# Patient Record
Sex: FEMALE | Race: BLACK OR AFRICAN AMERICAN | NOT HISPANIC OR LATINO | Employment: OTHER | URBAN - METROPOLITAN AREA
[De-identification: names, ages, dates, MRNs, and addresses within clinical notes are randomized per-mention and may not be internally consistent; named-entity substitution may affect disease eponyms.]

---

## 2017-01-28 ENCOUNTER — HOSPITAL ENCOUNTER (EMERGENCY)
Facility: HOSPITAL | Age: 57
Discharge: HOME | End: 2017-01-28
Attending: EMERGENCY MEDICINE | Admitting: EMERGENCY MEDICINE

## 2017-01-28 ENCOUNTER — APPOINTMENT (OUTPATIENT)
Dept: GENERAL RADIOLOGY | Facility: HOSPITAL | Age: 57
End: 2017-01-28

## 2017-01-28 VITALS
OXYGEN SATURATION: 95 % | TEMPERATURE: 97 F | WEIGHT: 230 LBS | SYSTOLIC BLOOD PRESSURE: 146 MMHG | DIASTOLIC BLOOD PRESSURE: 80 MMHG | HEIGHT: 66 IN | BODY MASS INDEX: 36.96 KG/M2 | RESPIRATION RATE: 18 BRPM | HEART RATE: 88 BPM

## 2017-01-28 DIAGNOSIS — R00.2 PALPITATIONS: ICD-10-CM

## 2017-01-28 DIAGNOSIS — I47.1 SVT (SUPRAVENTRICULAR TACHYCARDIA) (HCC): Primary | ICD-10-CM

## 2017-01-28 LAB
ALBUMIN SERPL-MCNC: 4.5 G/DL (ref 3.5–5.2)
ALBUMIN/GLOB SERPL: 1.5 G/DL
ALP SERPL-CCNC: 69 U/L (ref 39–117)
ALT SERPL W P-5'-P-CCNC: 46 U/L (ref 1–33)
ANION GAP SERPL CALCULATED.3IONS-SCNC: 16.7 MMOL/L
AST SERPL-CCNC: 34 U/L (ref 1–32)
BASOPHILS # BLD AUTO: 0.05 10*3/MM3 (ref 0–0.2)
BASOPHILS NFR BLD AUTO: 0.6 % (ref 0–1.5)
BILIRUB SERPL-MCNC: 0.2 MG/DL (ref 0.1–1.2)
BUN BLD-MCNC: 14 MG/DL (ref 6–20)
BUN/CREAT SERPL: 15.4 (ref 7–25)
CALCIUM SPEC-SCNC: 9.6 MG/DL (ref 8.6–10.5)
CHLORIDE SERPL-SCNC: 96 MMOL/L (ref 98–107)
CO2 SERPL-SCNC: 24.3 MMOL/L (ref 22–29)
CREAT BLD-MCNC: 0.91 MG/DL (ref 0.57–1)
DEPRECATED RDW RBC AUTO: 40 FL (ref 37–54)
EOSINOPHIL # BLD AUTO: 0.25 10*3/MM3 (ref 0–0.7)
EOSINOPHIL NFR BLD AUTO: 3.1 % (ref 0.3–6.2)
ERYTHROCYTE [DISTWIDTH] IN BLOOD BY AUTOMATED COUNT: 14.3 % (ref 11.7–13)
GFR SERPL CREATININE-BSD FRML MDRD: 77 ML/MIN/1.73
GLOBULIN UR ELPH-MCNC: 3 GM/DL
GLUCOSE BLD-MCNC: 188 MG/DL (ref 65–99)
HCT VFR BLD AUTO: 45.2 % (ref 35.6–45.5)
HGB BLD-MCNC: 14.9 G/DL (ref 11.9–15.5)
HOLD SPECIMEN: NORMAL
HOLD SPECIMEN: NORMAL
IMM GRANULOCYTES # BLD: 0 10*3/MM3 (ref 0–0.03)
IMM GRANULOCYTES NFR BLD: 0 % (ref 0–0.5)
INR PPP: 1.38 (ref 0.9–1.1)
LYMPHOCYTES # BLD AUTO: 2.76 10*3/MM3 (ref 0.9–4.8)
LYMPHOCYTES NFR BLD AUTO: 34.5 % (ref 19.6–45.3)
MCH RBC QN AUTO: 25.8 PG (ref 26.9–32)
MCHC RBC AUTO-ENTMCNC: 33 G/DL (ref 32.4–36.3)
MCV RBC AUTO: 78.3 FL (ref 80.5–98.2)
MONOCYTES # BLD AUTO: 0.52 10*3/MM3 (ref 0.2–1.2)
MONOCYTES NFR BLD AUTO: 6.5 % (ref 5–12)
NEUTROPHILS # BLD AUTO: 4.42 10*3/MM3 (ref 1.9–8.1)
NEUTROPHILS NFR BLD AUTO: 55.3 % (ref 42.7–76)
PLATELET # BLD AUTO: 385 10*3/MM3 (ref 140–500)
PMV BLD AUTO: 11.2 FL (ref 6–12)
POTASSIUM BLD-SCNC: 3.5 MMOL/L (ref 3.5–5.2)
PROT SERPL-MCNC: 7.5 G/DL (ref 6–8.5)
PROTHROMBIN TIME: 16.4 SECONDS (ref 11.7–14.2)
RBC # BLD AUTO: 5.77 10*6/MM3 (ref 3.9–5.2)
SODIUM BLD-SCNC: 137 MMOL/L (ref 136–145)
TROPONIN T SERPL-MCNC: <0.01 NG/ML (ref 0–0.03)
WBC NRBC COR # BLD: 8 10*3/MM3 (ref 4.5–10.7)
WHOLE BLOOD HOLD SPECIMEN: NORMAL
WHOLE BLOOD HOLD SPECIMEN: NORMAL

## 2017-01-28 PROCEDURE — 80053 COMPREHEN METABOLIC PANEL: CPT | Performed by: EMERGENCY MEDICINE

## 2017-01-28 PROCEDURE — 71020 HC CHEST PA AND LATERAL: CPT

## 2017-01-28 PROCEDURE — 25010000002 ADENOSINE PER 6 MG

## 2017-01-28 PROCEDURE — 85610 PROTHROMBIN TIME: CPT | Performed by: EMERGENCY MEDICINE

## 2017-01-28 PROCEDURE — 96374 THER/PROPH/DIAG INJ IV PUSH: CPT

## 2017-01-28 PROCEDURE — 84484 ASSAY OF TROPONIN QUANT: CPT | Performed by: EMERGENCY MEDICINE

## 2017-01-28 PROCEDURE — 93010 ELECTROCARDIOGRAM REPORT: CPT | Performed by: INTERNAL MEDICINE

## 2017-01-28 PROCEDURE — 85025 COMPLETE CBC W/AUTO DIFF WBC: CPT | Performed by: EMERGENCY MEDICINE

## 2017-01-28 PROCEDURE — 93005 ELECTROCARDIOGRAM TRACING: CPT | Performed by: EMERGENCY MEDICINE

## 2017-01-28 PROCEDURE — 99283 EMERGENCY DEPT VISIT LOW MDM: CPT

## 2017-01-28 RX ORDER — ADENOSINE 3 MG/ML
6 INJECTION, SOLUTION INTRAVENOUS ONCE
Status: COMPLETED | OUTPATIENT
Start: 2017-01-28 | End: 2017-01-28

## 2017-01-28 RX ORDER — ASPIRIN 325 MG
325 TABLET ORAL ONCE
Status: DISCONTINUED | OUTPATIENT
Start: 2017-01-28 | End: 2017-01-28 | Stop reason: HOSPADM

## 2017-01-28 RX ORDER — SODIUM CHLORIDE 0.9 % (FLUSH) 0.9 %
10 SYRINGE (ML) INJECTION AS NEEDED
Status: DISCONTINUED | OUTPATIENT
Start: 2017-01-28 | End: 2017-01-28 | Stop reason: HOSPADM

## 2017-01-28 RX ORDER — ADENOSINE 3 MG/ML
12 INJECTION, SOLUTION INTRAVENOUS ONCE
Status: DISCONTINUED | OUTPATIENT
Start: 2017-01-28 | End: 2017-01-28

## 2017-01-28 RX ORDER — ADENOSINE 3 MG/ML
INJECTION, SOLUTION INTRAVENOUS
Status: COMPLETED
Start: 2017-01-28 | End: 2017-01-28

## 2017-01-28 RX ADMIN — ADENOSINE 6 MG: 3 INJECTION, SOLUTION INTRAVENOUS at 04:41

## 2017-01-28 NOTE — ED PROVIDER NOTES
EMERGENCY DEPARTMENT ENCOUNTER    CHIEF COMPLAINT  Chief Complaint: Palpitations  History given by: patient  History limited by: n/a  Room Number: 07/07  PMD: Kit Gonzalez MD      HPI:  Pt is a 56 y.o. female who presents complaining of palpitations that were present today upon waking up. Pt states that she has a hx of SVT and attempted to bear down and use cold water to slow her HR w/o relief. Pt states that in the past, she was converted with Adenosine. Pt states that she had an ablation 12 years ago, and has been w/o SVT since. Pt has chronic RUE and RLE weakness secondary to a CVA    Duration:  Prior to arrival  Onset: sudden  Timing: constant  Location: cardio  Radiation: none  Quality: heart racing  Intensity/Severity: severe  Progression: unchanged   Associated Symptoms: none  Aggravating Factors: none  Alleviating Factors: none  Previous Episodes: hx of SVT  Treatment before arrival: cardiac ablation 12 years ago    PAST MEDICAL HISTORY  Active Ambulatory Problems     Diagnosis Date Noted   • Degeneration of cervical intervertebral disc 05/31/2016   • Right spastic hemiparesis 05/31/2016   • Acute metabolic encephalopathy 11/29/2016     Resolved Ambulatory Problems     Diagnosis Date Noted   • No Resolved Ambulatory Problems     Past Medical History   Diagnosis Date   • Diabetes mellitus    • Hyperlipidemia    • Hypertension    • Stroke        PAST SURGICAL HISTORY  Past Surgical History   Procedure Laterality Date   • Spine surgery         FAMILY HISTORY  Family History   Problem Relation Age of Onset   • Hypertension Mother    • Diabetes Mother    • Ovarian cancer Mother    • Cancer Father        SOCIAL HISTORY  Social History     Social History   • Marital status: Single     Spouse name: N/A   • Number of children: N/A   • Years of education: N/A     Occupational History   • Not on file.     Social History Main Topics   • Smoking status: Former Smoker     Packs/day: 0.25     Years: 15.00   •  Smokeless tobacco: Never Used   • Alcohol use No   • Drug use: No   • Sexual activity: Not on file     Other Topics Concern   • Not on file     Social History Narrative       ALLERGIES  Metformin and related    REVIEW OF SYSTEMS  Review of Systems   Constitutional: Negative for chills and fever.   HENT: Negative for congestion and sore throat.    Eyes: Negative.    Respiratory: Negative for cough and shortness of breath.    Cardiovascular: Positive for palpitations. Negative for chest pain and leg swelling.   Gastrointestinal: Negative for abdominal pain, diarrhea and vomiting.   Genitourinary: Negative for difficulty urinating and dysuria.   Musculoskeletal: Negative for back pain and neck pain.   Skin: Negative for rash and wound.   Allergic/Immunologic: Negative.    Neurological: Negative for dizziness, weakness, numbness and headaches.   Psychiatric/Behavioral: Negative.    All other systems reviewed and are negative.      PHYSICAL EXAM  ED Triage Vitals   Temp Heart Rate Resp BP SpO2   01/28/17 0427 01/28/17 0426 01/28/17 0426 -- 01/28/17 0426   97 °F (36.1 °C) 196 20  96 %      Temp src Heart Rate Source Patient Position BP Location FiO2 (%)   01/28/17 0427 -- -- -- --   Tympanic           Physical Exam   Constitutional: She is oriented to person, place, and time and well-developed, well-nourished, and in no distress. No distress.   HENT:   Head: Normocephalic and atraumatic.   Eyes: EOM are normal. Pupils are equal, round, and reactive to light.   Neck: Normal range of motion. Neck supple.   Cardiovascular: Regular rhythm and normal heart sounds.  Tachycardia present.    Pulmonary/Chest: Effort normal and breath sounds normal. No respiratory distress.   Abdominal: Soft. There is no tenderness. There is no rebound and no guarding.   Musculoskeletal: Normal range of motion. She exhibits no edema.   Neurological: She is alert and oriented to person, place, and time.   Chronic RUE and RLE weakness    Skin: Skin  is warm and dry. No rash noted.   Psychiatric: Mood and affect normal.   Nursing note and vitals reviewed.      LAB RESULTS  Lab Results (last 24 hours)     Procedure Component Value Units Date/Time    CBC & Differential [[id number]] Collected:  01/28/17 0440    Specimen:  Blood Updated:  01/28/17 0457    Narrative:       The following orders were created for panel order CBC & Differential.  Procedure                               Abnormality         Status                     ---------                               -----------         ------                     CBC Auto Differential[[id number]]         Abnormal            Final result                 Please view results for these tests on the individual orders.    Comprehensive Metabolic Panel [[id number]]  (Abnormal) Collected:  01/28/17 0440    Specimen:  Blood Updated:  01/28/17 0517     Glucose 188 (H) mg/dL      BUN 14 mg/dL      Creatinine 0.91 mg/dL      Sodium 137 mmol/L      Potassium 3.5 mmol/L      Chloride 96 (L) mmol/L      CO2 24.3 mmol/L      Calcium 9.6 mg/dL      Total Protein 7.5 g/dL      Albumin 4.50 g/dL      ALT (SGPT) 46 (H) U/L      AST (SGOT) 34 (H) U/L      Alkaline Phosphatase 69 U/L      Total Bilirubin 0.2 mg/dL      eGFR   Amer 77 mL/min/1.73      Globulin 3.0 gm/dL      A/G Ratio 1.5 g/dL      BUN/Creatinine Ratio 15.4      Anion Gap 16.7 mmol/L     Troponin [[id number]]  (Normal) Collected:  01/28/17 0440    Specimen:  Blood Updated:  01/28/17 0517     Troponin T <0.010 ng/mL     Narrative:       Troponin T Reference Ranges:  Less than 0.03 ng/mL:    Negative for AMI  0.03 to 0.09 ng/mL:      Indeterminant for AMI  Greater than 0.09 ng/mL: Positive for AMI    Protime-INR [[id number]]  (Abnormal) Collected:  01/28/17 0440    Specimen:  Blood Updated:  01/28/17 0510     Protime 16.4 (H) Seconds      INR 1.38 (H)     CBC Auto Differential [[id number]]  (Abnormal) Collected:  01/28/17 0440    Specimen:  Blood Updated:  01/28/17 0457     WBC  8.00 10*3/mm3      RBC 5.77 (H) 10*6/mm3      Hemoglobin 14.9 g/dL      Hematocrit 45.2 %      MCV 78.3 (L) fL      MCH 25.8 (L) pg      MCHC 33.0 g/dL      RDW 14.3 (H) %      RDW-SD 40.0 fl      MPV 11.2 fL      Platelets 385 10*3/mm3      Neutrophil % 55.3 %      Lymphocyte % 34.5 %      Monocyte % 6.5 %      Eosinophil % 3.1 %      Basophil % 0.6 %      Immature Grans % 0.0 %      Neutrophils, Absolute 4.42 10*3/mm3      Lymphocytes, Absolute 2.76 10*3/mm3      Monocytes, Absolute 0.52 10*3/mm3      Eosinophils, Absolute 0.25 10*3/mm3      Basophils, Absolute 0.05 10*3/mm3      Immature Grans, Absolute 0.00 10*3/mm3           I ordered the above labs and reviewed the results    RADIOLOGY  XR Chest 2 View   Final Result      CXR shows NAD    I ordered the above noted radiological studies. Interpreted by radiologist. Reviewed by me in PACS.       PROCEDURES  Procedures    EKG           EKG time: 04:39  Rhythm/Rate:   P waves and DE: absent  QRS, axis: widened with RBBB   ST and T waves: nml     Interpreted Contemporaneously by me, independently viewed  No prior for comparison     EKG           EKG time: 04;45  Rhythm/Rate: NSR 98  P waves and DE: nml  QRS, axis: widened with RBBB   ST and T waves: nml     Interpreted Contemporaneously by me, independently viewed  changed compared to prior at 04:39    PROGRESS AND CONSULTS  ED Course     04:31  HR is in the 190's.     04:32  Adenosine ordered    04:36  Labs, CXR, and EKG ordered for further evaluation.     04:42  6 of Adenosine given.    04:43  Pt is back in sinus rhythm. HR in the low 100's.     05:53  Rechecked the patient who is in NAD and is resting comfortably. Pt has remained in a NSR, HR currently in the 90's. Advised pt that the work up shows NAD. Will provide referral for the pt to f/u with cardiology on Monday. Pt understands and agrees with the plan, all questions answered.    MEDICAL DECISION MAKING  Results were reviewed/discussed with the  patient and they were also made aware of online access. Pt also made aware that some labs, such as cultures, will not be resulted during ER visit and follow up with PMD is necessary.     MDM  Number of Diagnoses or Management Options     Amount and/or Complexity of Data Reviewed  Clinical lab tests: ordered and reviewed (WBC is 8.00  PT/INR is 16.4/1.38  Troponin is <0.010)  Tests in the radiology section of CPT®: ordered and reviewed (CXR shows NAD)  Tests in the medicine section of CPT®: ordered and reviewed (See EKG procedure note. )  Independent visualization of images, tracings, or specimens: yes    Patient Progress  Patient progress: stable         DIAGNOSIS  Final diagnoses:   SVT (supraventricular tachycardia)   Palpitations       DISPOSITION  DISCHARGE    Patient discharged in stable condition.    Reviewed implications of results, diagnosis, meds, responsibility to follow up, warning signs and symptoms of possible worsening, potential complications and reasons to return to ER.    Patient/Family voiced understanding of above instructions.    Discussed plan for discharge, as there is no emergent indication for admission.  Pt/family is agreeable and understands need for follow up and repeat testing.  Pt is aware that discharge does not mean that nothing is wrong but it indicates no emergency is present that requires admission and they must continue care with follow-up as given below or physician of their choice.     FOLLOW-UP  Danny Bains MD  [facility] [id number] [phone]    Schedule an appointment as soon as possible for a visit           Medication List      Notice     No changes were made to your prescriptions during this visit.        Latest Documented Vital Signs:  As of 6:47 AM  BP- 146/80 HR- 88 Temp- 97 °F (36.1 °C) (Tympanic) O2 sat- 95%    --  Documentation assistance provided by silva Johnson for Dr York.  Information recorded by the silva was done  at my direction and has been verified and validated by me.           Dayan Johnson  01/28/17 0602       Darin York MD  01/28/17 0647

## 2017-02-02 ENCOUNTER — HOSPITAL ENCOUNTER (EMERGENCY)
Facility: HOSPITAL | Age: 57
Discharge: HOME | End: 2017-02-02
Attending: EMERGENCY MEDICINE | Admitting: EMERGENCY MEDICINE

## 2017-02-02 VITALS
DIASTOLIC BLOOD PRESSURE: 67 MMHG | RESPIRATION RATE: 16 BRPM | SYSTOLIC BLOOD PRESSURE: 112 MMHG | OXYGEN SATURATION: 96 % | BODY MASS INDEX: 36.96 KG/M2 | HEIGHT: 66 IN | WEIGHT: 230 LBS | HEART RATE: 64 BPM | TEMPERATURE: 96.8 F

## 2017-02-02 DIAGNOSIS — R00.2 HEART PALPITATIONS: Primary | ICD-10-CM

## 2017-02-02 PROCEDURE — 93010 ELECTROCARDIOGRAM REPORT: CPT | Performed by: INTERNAL MEDICINE

## 2017-02-02 PROCEDURE — 93005 ELECTROCARDIOGRAM TRACING: CPT

## 2017-02-02 PROCEDURE — 99283 EMERGENCY DEPT VISIT LOW MDM: CPT

## 2017-02-03 NOTE — ED PROVIDER NOTES
"EMERGENCY DEPARTMENT ENCOUNTER       CHIEF COMPLAINT  Chief Complaint: Palpitations  History given by: Patient  History limited by: N/A  Room Number: 16/16  PMD: Kit Gonzalez MD      HPI:  HPI Comments: Pt reports that she has h/o SVT. Pt reports that at about 17:45 today, she had an episode of palpitations described as \"rapid\" that has now resolved. Pt reports that during her palpitations, pt was lightheaded (also resolved). Pt denies dyspnea and CP currently and reports that she feels at her baseline. There are no other complaints at this time.       Patient is a 56 y.o. female presenting with palpitations.   Palpitations   Palpitations quality:  Fast  Onset quality:  Sudden  Duration: onset at about 17:45 today.  Timing:  Constant  Progression:  Resolved  Context: not dehydration    Relieved by:  Nothing  Worsened by:  Nothing  Associated symptoms: dizziness (lightheadedness with palpitations - now resolved)    Associated symptoms: no back pain, no chest pain, no chest pressure, no cough, no leg pain, no lower extremity edema, no malaise/fatigue, no nausea, no numbness, no orthopnea, no PND, no syncope, no vomiting and no weakness          PAST MEDICAL HISTORY  Active Ambulatory Problems     Diagnosis Date Noted   • Degeneration of cervical intervertebral disc 05/31/2016   • Right spastic hemiparesis 05/31/2016   • Acute metabolic encephalopathy 11/29/2016     Resolved Ambulatory Problems     Diagnosis Date Noted   • No Resolved Ambulatory Problems     Past Medical History   Diagnosis Date   • Diabetes mellitus    • Hyperlipidemia    • Hypertension    • Stroke          PAST SURGICAL HISTORY  Past Surgical History   Procedure Laterality Date   • Spine surgery           FAMILY HISTORY  Family History   Problem Relation Age of Onset   • Hypertension Mother    • Diabetes Mother    • Ovarian cancer Mother    • Cancer Father          SOCIAL HISTORY  Social History     Social History   • Marital status: Single    " " Spouse name: N/A   • Number of children: N/A   • Years of education: N/A     Occupational History   • Not on file.     Social History Main Topics   • Smoking status: Former Smoker     Packs/day: 0.25     Years: 15.00   • Smokeless tobacco: Never Used   • Alcohol use No   • Drug use: No   • Sexual activity: Not on file     Other Topics Concern   • Not on file     Social History Narrative         ALLERGIES  Metformin and related        REVIEW OF SYSTEMS  Review of Systems   Constitutional: Negative for chills, fatigue and malaise/fatigue.   HENT: Negative for congestion, rhinorrhea and sore throat.    Eyes: Negative for pain.   Respiratory: Negative for cough and wheezing.    Cardiovascular: Positive for palpitations (\"rapid\" - now resolved). Negative for chest pain, orthopnea, leg swelling, syncope and PND.   Gastrointestinal: Negative for abdominal pain, diarrhea, nausea and vomiting.   Genitourinary: Negative for difficulty urinating, dysuria, flank pain and frequency.   Musculoskeletal: Negative for arthralgias, back pain, myalgias, neck pain and neck stiffness.   Skin: Negative for rash.   Neurological: Positive for dizziness (lightheadedness with palpitations - now resolved) and light-headedness (lightheadedness with palpitations - now resolved). Negative for speech difficulty, weakness, numbness and headaches.   Psychiatric/Behavioral: Negative.    All other systems reviewed and are negative.           PHYSICAL EXAM  ED Triage Vitals   Temp Heart Rate Resp BP SpO2   02/02/17 1801 02/02/17 1801 02/02/17 1801 02/02/17 1806 02/02/17 1801   96.8 °F (36 °C) 66 18 169/79 99 % WNL      Temp src Heart Rate Source Patient Position BP Location FiO2 (%)   02/02/17 1801 02/02/17 2119 02/02/17 1806 02/02/17 1806 --   Tympanic Monitor Sitting Left arm        Physical Exam   Constitutional: She is oriented to person, place, and time. No distress.   HENT:   Head: Normocephalic.   Mouth/Throat: Mucous membranes are normal. "   Eyes: EOM are normal. Pupils are equal, round, and reactive to light.   Neck: Normal range of motion. Neck supple.   Cardiovascular: Normal rate, regular rhythm and normal heart sounds.    Pulmonary/Chest: Effort normal and breath sounds normal. No respiratory distress. She has no decreased breath sounds. She has no wheezes. She has no rhonchi. She has no rales.   Abdominal: Soft. There is no tenderness. There is no rebound and no guarding.   Musculoskeletal: Normal range of motion.   Neurological: She is alert and oriented to person, place, and time. She has normal sensation and normal strength.   Skin: Skin is warm and dry.   Psychiatric: Mood and affect normal.   Nursing note and vitals reviewed.            PROCEDURES  Procedures    EKG:           EKG time: 18:33  Rhythm/Rate: NSR   QRS, axis: RBBB     Interpreted Contemporaneously by me, independently viewed  Unchanged compared to prior 01/28/17      PROGRESS AND CONSULTS  ED Course     10:58 PM: Rechecked pt. Pt is resting comfortably and appears in no acute distress. Pt is currently in NSR rate 61. Pt advised to f/u with cardiologist. RTER warnings given. Pt agrees with plan for discharge.           MEDICAL DECISION MAKING      MDM  Number of Diagnoses or Management Options     Amount and/or Complexity of Data Reviewed  Tests in the medicine section of CPT®: reviewed and ordered (EKG interpreted.   )    Patient Progress  Patient progress: stable             DIAGNOSIS  Final diagnoses:   Heart palpitations         DISPOSITION  Pt discharged.    DISCHARGE    Patient discharged in stable condition.    Reviewed implications of results, diagnosis, meds, responsibility to follow up, warning signs and symptoms of possible worsening, potential complications and reasons to return to ER.    Patient/Family voiced understanding of above instructions.    Discussed plan for discharge, as there is no emergent indication for admission.  Pt/family is agreeable and  understands need for follow up and repeat testing.  Pt is aware that discharge does not mean that nothing is wrong but it indicates no emergency is present that requires admission and they must continue care with follow-up as given below or physician of their choice.     FOLLOW-UP  Danny Bains MD  [address]  [address] [address]  [phone]    Schedule an appointment as soon as possible for a visit          Latest Documented Vital Signs:  As of 11:03 PM  BP- 112/67 HR- 68 Temp- 96.8 °F (36 °C) (Tympanic) O2 sat- 96%        --  Documentation assistance provided by silva Sutton for Dr. Ninoska MD.  Information recorded by the scribe was done at my direction and has been verified and validated by me.              Colin Sutton  02/02/17 9529       Beni Xiao MD  02/03/17 3929

## 2017-02-03 NOTE — ED NOTES
"Patient reports that earlier today she felt her heart beating fast. States \"It would go fast and then it would slow down. Then it would go fast again then slow down. When it was fast I was sweating and short of breathe but not I feel fine.\" No complaints at this time.      Keke Phillips RN  02/02/17 5912    "

## 2017-03-25 ENCOUNTER — APPOINTMENT (OUTPATIENT)
Dept: GENERAL RADIOLOGY | Facility: HOSPITAL | Age: 57
End: 2017-03-25

## 2017-03-25 ENCOUNTER — HOSPITAL ENCOUNTER (EMERGENCY)
Facility: HOSPITAL | Age: 57
Discharge: HOME | End: 2017-03-25
Attending: EMERGENCY MEDICINE | Admitting: EMERGENCY MEDICINE

## 2017-03-25 VITALS
DIASTOLIC BLOOD PRESSURE: 72 MMHG | SYSTOLIC BLOOD PRESSURE: 150 MMHG | HEIGHT: 66 IN | BODY MASS INDEX: 36.48 KG/M2 | RESPIRATION RATE: 18 BRPM | WEIGHT: 227 LBS | TEMPERATURE: 96.8 F | HEART RATE: 80 BPM | OXYGEN SATURATION: 98 %

## 2017-03-25 DIAGNOSIS — M10.9 ACUTE GOUT OF LEFT FOOT, UNSPECIFIED CAUSE: Primary | ICD-10-CM

## 2017-03-25 LAB
ALBUMIN SERPL-MCNC: 4.4 G/DL (ref 3.5–5.2)
ALBUMIN/GLOB SERPL: 1.3 G/DL
ALP SERPL-CCNC: 67 U/L (ref 39–117)
ALT SERPL W P-5'-P-CCNC: 45 U/L (ref 1–33)
ANION GAP SERPL CALCULATED.3IONS-SCNC: 15.4 MMOL/L
AST SERPL-CCNC: 36 U/L (ref 1–32)
BASOPHILS # BLD AUTO: 0.03 10*3/MM3 (ref 0–0.2)
BASOPHILS NFR BLD AUTO: 0.4 % (ref 0–1.5)
BILIRUB SERPL-MCNC: 0.4 MG/DL (ref 0.1–1.2)
BUN BLD-MCNC: 16 MG/DL (ref 6–20)
BUN/CREAT SERPL: 16.3 (ref 7–25)
CALCIUM SPEC-SCNC: 9.6 MG/DL (ref 8.6–10.5)
CHLORIDE SERPL-SCNC: 97 MMOL/L (ref 98–107)
CO2 SERPL-SCNC: 29.6 MMOL/L (ref 22–29)
CREAT BLD-MCNC: 0.98 MG/DL (ref 0.57–1)
DEPRECATED RDW RBC AUTO: 40.8 FL (ref 37–54)
EOSINOPHIL # BLD AUTO: 0.12 10*3/MM3 (ref 0–0.7)
EOSINOPHIL NFR BLD AUTO: 1.6 % (ref 0.3–6.2)
ERYTHROCYTE [DISTWIDTH] IN BLOOD BY AUTOMATED COUNT: 14.4 % (ref 11.7–13)
GFR SERPL CREATININE-BSD FRML MDRD: 71 ML/MIN/1.73
GLOBULIN UR ELPH-MCNC: 3.4 GM/DL
GLUCOSE BLD-MCNC: 171 MG/DL (ref 65–99)
HCT VFR BLD AUTO: 43.2 % (ref 35.6–45.5)
HGB BLD-MCNC: 14.1 G/DL (ref 11.9–15.5)
IMM GRANULOCYTES # BLD: 0.02 10*3/MM3 (ref 0–0.03)
IMM GRANULOCYTES NFR BLD: 0.3 % (ref 0–0.5)
LYMPHOCYTES # BLD AUTO: 1.46 10*3/MM3 (ref 0.9–4.8)
LYMPHOCYTES NFR BLD AUTO: 19.5 % (ref 19.6–45.3)
MCH RBC QN AUTO: 25.5 PG (ref 26.9–32)
MCHC RBC AUTO-ENTMCNC: 32.6 G/DL (ref 32.4–36.3)
MCV RBC AUTO: 78.3 FL (ref 80.5–98.2)
MONOCYTES # BLD AUTO: 0.57 10*3/MM3 (ref 0.2–1.2)
MONOCYTES NFR BLD AUTO: 7.6 % (ref 5–12)
NEUTROPHILS # BLD AUTO: 5.3 10*3/MM3 (ref 1.9–8.1)
NEUTROPHILS NFR BLD AUTO: 70.6 % (ref 42.7–76)
PLATELET # BLD AUTO: 326 10*3/MM3 (ref 140–500)
PMV BLD AUTO: 11 FL (ref 6–12)
POTASSIUM BLD-SCNC: 3.2 MMOL/L (ref 3.5–5.2)
PROT SERPL-MCNC: 7.8 G/DL (ref 6–8.5)
RBC # BLD AUTO: 5.52 10*6/MM3 (ref 3.9–5.2)
SODIUM BLD-SCNC: 142 MMOL/L (ref 136–145)
URATE SERPL-MCNC: 9.9 MG/DL (ref 2.4–5.7)
WBC NRBC COR # BLD: 7.5 10*3/MM3 (ref 4.5–10.7)

## 2017-03-25 PROCEDURE — 80053 COMPREHEN METABOLIC PANEL: CPT | Performed by: EMERGENCY MEDICINE

## 2017-03-25 PROCEDURE — 73630 X-RAY EXAM OF FOOT: CPT

## 2017-03-25 PROCEDURE — 84550 ASSAY OF BLOOD/URIC ACID: CPT | Performed by: EMERGENCY MEDICINE

## 2017-03-25 PROCEDURE — 85025 COMPLETE CBC W/AUTO DIFF WBC: CPT | Performed by: EMERGENCY MEDICINE

## 2017-03-25 PROCEDURE — 99284 EMERGENCY DEPT VISIT MOD MDM: CPT

## 2017-03-25 RX ORDER — SENNOSIDES 8.6 MG
TABLET ORAL AS NEEDED
COMMUNITY

## 2017-03-25 RX ORDER — HYDROCODONE BITARTRATE AND ACETAMINOPHEN 5; 325 MG/1; MG/1
TABLET ORAL EVERY 8 HOURS
COMMUNITY
Start: 2016-11-19 | End: 2019-01-05

## 2017-03-25 RX ORDER — ONDANSETRON 4 MG/1
4 TABLET, ORALLY DISINTEGRATING ORAL ONCE
Status: COMPLETED | OUTPATIENT
Start: 2017-03-25 | End: 2017-03-25

## 2017-03-25 RX ORDER — COLCHICINE 0.6 MG/1
0.6 TABLET ORAL DAILY
Qty: 20 TABLET | Refills: 0 | OUTPATIENT
Start: 2017-03-25 | End: 2019-03-05

## 2017-03-25 RX ORDER — HYDROCODONE BITARTRATE AND ACETAMINOPHEN 5; 325 MG/1; MG/1
1 TABLET ORAL EVERY 4 HOURS PRN
Qty: 12 TABLET | Refills: 0 | Status: SHIPPED | OUTPATIENT
Start: 2017-03-25 | End: 2019-01-05 | Stop reason: SDUPTHER

## 2017-03-25 RX ORDER — GLIPIZIDE 10 MG/1
10 TABLET ORAL 2 TIMES DAILY
COMMUNITY

## 2017-03-25 RX ORDER — OXYCODONE HYDROCHLORIDE AND ACETAMINOPHEN 5; 325 MG/1; MG/1
2 TABLET ORAL ONCE
Status: COMPLETED | OUTPATIENT
Start: 2017-03-25 | End: 2017-03-25

## 2017-03-25 RX ORDER — GABAPENTIN 300 MG/1
CAPSULE ORAL
COMMUNITY

## 2017-03-25 RX ORDER — METOPROLOL SUCCINATE 25 MG/1
25 TABLET, EXTENDED RELEASE ORAL
COMMUNITY
Start: 2017-03-08 | End: 2017-04-17

## 2017-03-25 RX ORDER — HYDROCHLOROTHIAZIDE 25 MG/1
TABLET ORAL
COMMUNITY

## 2017-03-25 RX ORDER — AMMONIUM LACTATE 12 G/100G
CREAM TOPICAL
COMMUNITY
Start: 2016-09-29

## 2017-03-25 RX ORDER — LOSARTAN POTASSIUM 25 MG/1
TABLET ORAL
COMMUNITY
Start: 2016-04-02 | End: 2019-03-05

## 2017-03-25 RX ORDER — GLIMEPIRIDE 4 MG/1
TABLET ORAL
COMMUNITY
Start: 2016-02-23

## 2017-03-25 RX ORDER — OXYBUTYNIN CHLORIDE 10 MG/1
TABLET, EXTENDED RELEASE ORAL
COMMUNITY

## 2017-03-25 RX ORDER — COLCHICINE 0.6 MG/1
TABLET ORAL
COMMUNITY
Start: 2016-11-19 | End: 2019-03-05

## 2017-03-25 RX ADMIN — OXYCODONE HYDROCHLORIDE AND ACETAMINOPHEN 2 TABLET: 5; 325 TABLET ORAL at 09:15

## 2017-03-25 RX ADMIN — ONDANSETRON 4 MG: 4 TABLET, ORALLY DISINTEGRATING ORAL at 09:17

## 2017-03-25 NOTE — ED PROVIDER NOTES
EMERGENCY DEPARTMENT ENCOUNTER    CHIEF COMPLAINT  Chief Complaint: Foot pain  History given by:Pt  History limited by:None  Room Number: 03/03  PMD: Kit Gonzalez MD      HPI:  Pt is a 56 y.o. female who presents history of gout who presents complaining of left foot pain onset 3 days ago.  Patient denies recent injury or trauma, and states that pain has progressively gotten worse. She reports she takes meds for Gout and ran out yesterday. She is also on Xarelto.    Past Medical History of DM, Gout, HTN    Duration: 3 days  Timing:constant  Location:left foot  Radiation:none  Quality:did not specify  Intensity/Severity:moderate  Progression:gradually worsening  Associated Symptoms:none  Aggravating Factors:movement  Alleviating Factors:none  Previous Episodes:hx of gout  Treatment before arrival:none    PAST MEDICAL HISTORY  Active Ambulatory Problems     Diagnosis Date Noted   • Degeneration of cervical intervertebral disc 05/31/2016   • Right spastic hemiparesis 05/31/2016   • Acute metabolic encephalopathy 11/29/2016     Resolved Ambulatory Problems     Diagnosis Date Noted   • No Resolved Ambulatory Problems     Past Medical History:   Diagnosis Date   • Diabetes mellitus    • Gout    • Hyperlipidemia    • Hypertension    • Stroke        PAST SURGICAL HISTORY  Past Surgical History:   Procedure Laterality Date   • SPINE SURGERY         FAMILY HISTORY  Family History   Problem Relation Age of Onset   • Hypertension Mother    • Diabetes Mother    • Ovarian cancer Mother    • Cancer Father        SOCIAL HISTORY  Social History     Social History   • Marital status: Single     Spouse name: N/A   • Number of children: N/A   • Years of education: N/A     Occupational History   • Not on file.     Social History Main Topics   • Smoking status: Former Smoker     Packs/day: 0.25     Years: 15.00   • Smokeless tobacco: Never Used   • Alcohol use No   • Drug use: No   • Sexual activity: Not on file     Other Topics  Concern   • Not on file     Social History Narrative   • No narrative on file         ALLERGIES  Metformin and related    REVIEW OF SYSTEMS  Review of Systems   Constitutional: Negative for chills and fever.   HENT: Negative for sore throat.    Respiratory: Negative for shortness of breath.    Cardiovascular: Negative for chest pain.   Gastrointestinal: Negative for nausea and vomiting.   Genitourinary: Negative for dysuria.   Musculoskeletal: Negative for back pain.        Left foot pain   Skin: Negative for rash.   Neurological: Negative for dizziness.   Psychiatric/Behavioral: The patient is not nervous/anxious.        PHYSICAL EXAM  ED Triage Vitals   Temp Heart Rate Resp BP SpO2   03/25/17 0503 03/25/17 0503 03/25/17 0503 03/25/17 0503 03/25/17 0503   98.3 °F (36.8 °C) 93 16 183/94 98 %      Temp src Heart Rate Source Patient Position BP Location FiO2 (%)   03/25/17 0640 03/25/17 0640 03/25/17 0640 03/25/17 0640 --   Oral Monitor Lying Right arm        Physical Exam   Constitutional: She is well-developed, well-nourished, and in no distress.   HENT:   Head: Normocephalic.   Mouth/Throat: Mucous membranes are normal.   Eyes: No scleral icterus.   Neck: Normal range of motion.   Cardiovascular: Normal rate, regular rhythm and normal heart sounds.    Pulses:       Dorsalis pedis pulses are 2+ on the right side, and 2+ on the left side.        Posterior tibial pulses are 2+ on the right side, and 2+ on the left side.   Pulmonary/Chest: Effort normal and breath sounds normal.   Musculoskeletal: Normal range of motion.        Left foot: There is tenderness and swelling.        Feet:    Neurological: She is alert.   Skin: Skin is warm and dry.   Psychiatric: Mood and affect normal.   Nursing note and vitals reviewed.      LAB RESULTS  Recent Results (from the past 24 hour(s))   Comprehensive Metabolic Panel    Collection Time: 03/25/17  7:18 AM   Result Value Ref Range    Glucose 171 (H) 65 - 99 mg/dL    BUN 16 6 - 20  mg/dL    Creatinine 0.98 0.57 - 1.00 mg/dL    Sodium 142 136 - 145 mmol/L    Potassium 3.2 (L) 3.5 - 5.2 mmol/L    Chloride 97 (L) 98 - 107 mmol/L    CO2 29.6 (H) 22.0 - 29.0 mmol/L    Calcium 9.6 8.6 - 10.5 mg/dL    Total Protein 7.8 6.0 - 8.5 g/dL    Albumin 4.40 3.50 - 5.20 g/dL    ALT (SGPT) 45 (H) 1 - 33 U/L    AST (SGOT) 36 (H) 1 - 32 U/L    Alkaline Phosphatase 67 39 - 117 U/L    Total Bilirubin 0.4 0.1 - 1.2 mg/dL    eGFR  African Amer 71 >60 mL/min/1.73    Globulin 3.4 gm/dL    A/G Ratio 1.3 g/dL    BUN/Creatinine Ratio 16.3 7.0 - 25.0    Anion Gap 15.4 mmol/L   Uric Acid    Collection Time: 03/25/17  7:18 AM   Result Value Ref Range    Uric Acid 9.9 (H) 2.4 - 5.7 mg/dL   CBC Auto Differential    Collection Time: 03/25/17  7:18 AM   Result Value Ref Range    WBC 7.50 4.50 - 10.70 10*3/mm3    RBC 5.52 (H) 3.90 - 5.20 10*6/mm3    Hemoglobin 14.1 11.9 - 15.5 g/dL    Hematocrit 43.2 35.6 - 45.5 %    MCV 78.3 (L) 80.5 - 98.2 fL    MCH 25.5 (L) 26.9 - 32.0 pg    MCHC 32.6 32.4 - 36.3 g/dL    RDW 14.4 (H) 11.7 - 13.0 %    RDW-SD 40.8 37.0 - 54.0 fl    MPV 11.0 6.0 - 12.0 fL    Platelets 326 140 - 500 10*3/mm3    Neutrophil % 70.6 42.7 - 76.0 %    Lymphocyte % 19.5 (L) 19.6 - 45.3 %    Monocyte % 7.6 5.0 - 12.0 %    Eosinophil % 1.6 0.3 - 6.2 %    Basophil % 0.4 0.0 - 1.5 %    Immature Grans % 0.3 0.0 - 0.5 %    Neutrophils, Absolute 5.30 1.90 - 8.10 10*3/mm3    Lymphocytes, Absolute 1.46 0.90 - 4.80 10*3/mm3    Monocytes, Absolute 0.57 0.20 - 1.20 10*3/mm3    Eosinophils, Absolute 0.12 0.00 - 0.70 10*3/mm3    Basophils, Absolute 0.03 0.00 - 0.20 10*3/mm3    Immature Grans, Absolute 0.02 0.00 - 0.03 10*3/mm3       I ordered the above labs and reviewed the results    RADIOLOGY  XR Foot 3+ View Left - soft tissue swelling, chronic arthritic changes       I ordered the above noted radiological studies and reviewed the images on the PACS system.        PROGRESS AND CONSULTS    9:30AM  Reviewed pt's history and workup  "with Dr. Hilliard.  At bedside evaluation, they agree with the plan of care.    Reviewed implications of results, diagnosis, meds, responsibility to follow up, warning signs and symptoms of possible worsening, potential complications and reasons to return to ER with patient.  Discussed all results and noted any abnormalities with patient.  Discussed absolute need to recheck abnormalities with PCP    Discussed plan for discharge, as there is no emergent indication for admission.  Pt is agreeable and understands need for follow up and repeat testing.  Pt is aware that discharge does not mean that nothing is wrong but it indicates no emergency is present.  Pt is discharged with instructions to follow up with primary care doctor to have their blood pressure rechecked.       DIAGNOSIS  Final diagnoses:   Acute gout of left foot, unspecified cause       FOLLOW UP   Kit Gonzalez MD  [address] [address]  [phone]    Call in 2 days        RX     colchicine 0.6 MG tablet   Take 1 tablet by mouth Daily.      HYDROcodone-acetaminophen 5-325 MG per tablet   Commonly known as:  NORCO   What changed:  Another medication with the same name was added. Make sure   you understand how and when to take each.          COURSE & MEDICAL DECISION MAKING  Pertinent Labs and Imaging studies that were ordered and reviewed are noted above.  Results were reviewed/discussed with the patient and they were also made aware of online assess.       MEDICATIONS GIVEN IN ER  Medications   oxyCODONE-acetaminophen (PERCOCET) 5-325 MG per tablet 2 tablet (2 tablets Oral Given 3/25/17 0915)   ondansetron ODT (ZOFRAN-ODT) disintegrating tablet 4 mg (4 mg Oral Given 3/25/17 0917)       /68 (BP Location: Left arm, Patient Position: Lying)  Pulse 87  Temp 96.9 °F (36.1 °C) (Oral)   Resp 18  Ht 66\" (167.6 cm)  Wt 227 lb (103 kg)  SpO2 98%  BMI 36.64 kg/m2      I personally reviewed the past medical history, " past surgical history, social history, family history, current medications and allergies as they appear in this chart.  The scribe's note accurately reflects the work and decisions made by me.     I personally scribed for ROBERTA Yoder on 3/25/2017 at 10:03 AM.  Electronically signed by Nandini Diop on 3/25/2017 at time 10:03 AM          Nandini Diop  03/25/17 1003       Jacquie Alejandro, MARICHUY  03/25/17 1029

## 2017-03-25 NOTE — ED PROVIDER NOTES
I supervised care provided by the midlevel provider.    We have discussed this patient's history, physical exam, and treatment plan.   I have reviewed the note and personally saw and examined the patient and agree with the plan of care.    HPI:  Pt reports to the ED complaining of left foot pain onset 2 days ago. The pt denies injury. The pt reports a hx of gout and states the pain feels similar to her previous episodes. The pt states she recently ran out of her Colchicine.    Discussed the plan to discharge the pt with a prescription for Colchicine to help treat her gout. The pt understands and agrees with the plan.    Physical Exam:  Pt is neurovascularly intact, alert, and oriented X 3. Pt's heart is RRR, lungs are clear to auscultation bilaterally, and abd is soft and nontender. The pt is in NAD and their vital signs are stable. The pt has tenderness to her left foot with no erythema or calf tenderness.    MARICHUY York  03/25/17 6595       Good Hilliard MD  03/25/17 4316

## 2017-03-25 NOTE — DISCHARGE INSTRUCTIONS
Medications as ordered  Elevate foot as much as possible  Follow up with pmd in 3-5 days for recheck  Return to er for fever, worsening pain/swelling or any new or worsening symptoms

## 2017-04-17 ENCOUNTER — APPOINTMENT (OUTPATIENT)
Dept: GENERAL RADIOLOGY | Facility: HOSPITAL | Age: 57
End: 2017-04-17

## 2017-04-17 ENCOUNTER — HOSPITAL ENCOUNTER (EMERGENCY)
Facility: HOSPITAL | Age: 57
Discharge: HOME | End: 2017-04-17
Attending: EMERGENCY MEDICINE | Admitting: EMERGENCY MEDICINE

## 2017-04-17 VITALS
SYSTOLIC BLOOD PRESSURE: 142 MMHG | WEIGHT: 225 LBS | DIASTOLIC BLOOD PRESSURE: 77 MMHG | HEIGHT: 66 IN | HEART RATE: 76 BPM | TEMPERATURE: 97 F | OXYGEN SATURATION: 95 % | BODY MASS INDEX: 36.16 KG/M2 | RESPIRATION RATE: 16 BRPM

## 2017-04-17 DIAGNOSIS — I10 ESSENTIAL HYPERTENSION: ICD-10-CM

## 2017-04-17 DIAGNOSIS — I47.1 SVT (SUPRAVENTRICULAR TACHYCARDIA) (HCC): Primary | ICD-10-CM

## 2017-04-17 LAB
ALBUMIN SERPL-MCNC: 4.2 G/DL (ref 3.5–5.2)
ALBUMIN/GLOB SERPL: 1.4 G/DL
ALP SERPL-CCNC: 53 U/L (ref 39–117)
ALT SERPL W P-5'-P-CCNC: 34 U/L (ref 1–33)
ANION GAP SERPL CALCULATED.3IONS-SCNC: 15.1 MMOL/L
AST SERPL-CCNC: 33 U/L (ref 1–32)
BASOPHILS # BLD AUTO: 0.04 10*3/MM3 (ref 0–0.2)
BASOPHILS NFR BLD AUTO: 0.6 % (ref 0–1.5)
BILIRUB SERPL-MCNC: 0.3 MG/DL (ref 0.1–1.2)
BUN BLD-MCNC: 15 MG/DL (ref 6–20)
BUN/CREAT SERPL: 16.3 (ref 7–25)
CALCIUM SPEC-SCNC: 9.2 MG/DL (ref 8.6–10.5)
CHLORIDE SERPL-SCNC: 99 MMOL/L (ref 98–107)
CO2 SERPL-SCNC: 26.9 MMOL/L (ref 22–29)
CREAT BLD-MCNC: 0.92 MG/DL (ref 0.57–1)
DEPRECATED RDW RBC AUTO: 41.7 FL (ref 37–54)
EOSINOPHIL # BLD AUTO: 0.24 10*3/MM3 (ref 0–0.7)
EOSINOPHIL NFR BLD AUTO: 3.8 % (ref 0.3–6.2)
ERYTHROCYTE [DISTWIDTH] IN BLOOD BY AUTOMATED COUNT: 15 % (ref 11.7–13)
GFR SERPL CREATININE-BSD FRML MDRD: 76 ML/MIN/1.73
GLOBULIN UR ELPH-MCNC: 3.1 GM/DL
GLUCOSE BLD-MCNC: 140 MG/DL (ref 65–99)
HCT VFR BLD AUTO: 44.7 % (ref 35.6–45.5)
HGB BLD-MCNC: 14.4 G/DL (ref 11.9–15.5)
IMM GRANULOCYTES # BLD: 0 10*3/MM3 (ref 0–0.03)
IMM GRANULOCYTES NFR BLD: 0 % (ref 0–0.5)
INR PPP: 1.89 (ref 0.9–1.1)
LYMPHOCYTES # BLD AUTO: 2.08 10*3/MM3 (ref 0.9–4.8)
LYMPHOCYTES NFR BLD AUTO: 32.8 % (ref 19.6–45.3)
MAGNESIUM SERPL-MCNC: 1.9 MG/DL (ref 1.6–2.6)
MCH RBC QN AUTO: 25 PG (ref 26.9–32)
MCHC RBC AUTO-ENTMCNC: 32.2 G/DL (ref 32.4–36.3)
MCV RBC AUTO: 77.5 FL (ref 80.5–98.2)
MONOCYTES # BLD AUTO: 0.45 10*3/MM3 (ref 0.2–1.2)
MONOCYTES NFR BLD AUTO: 7.1 % (ref 5–12)
NEUTROPHILS # BLD AUTO: 3.54 10*3/MM3 (ref 1.9–8.1)
NEUTROPHILS NFR BLD AUTO: 55.7 % (ref 42.7–76)
NT-PROBNP SERPL-MCNC: 19.9 PG/ML (ref 5–900)
PLATELET # BLD AUTO: 368 10*3/MM3 (ref 140–500)
PMV BLD AUTO: 11.3 FL (ref 6–12)
POTASSIUM BLD-SCNC: 4 MMOL/L (ref 3.5–5.2)
PROT SERPL-MCNC: 7.3 G/DL (ref 6–8.5)
PROTHROMBIN TIME: 21 SECONDS (ref 11.7–14.2)
RBC # BLD AUTO: 5.77 10*6/MM3 (ref 3.9–5.2)
SODIUM BLD-SCNC: 141 MMOL/L (ref 136–145)
TROPONIN T SERPL-MCNC: <0.01 NG/ML (ref 0–0.03)
WBC NRBC COR # BLD: 6.35 10*3/MM3 (ref 4.5–10.7)

## 2017-04-17 PROCEDURE — 99284 EMERGENCY DEPT VISIT MOD MDM: CPT

## 2017-04-17 PROCEDURE — 83880 ASSAY OF NATRIURETIC PEPTIDE: CPT | Performed by: EMERGENCY MEDICINE

## 2017-04-17 PROCEDURE — 71010 HC CHEST PA OR AP: CPT

## 2017-04-17 PROCEDURE — 85025 COMPLETE CBC W/AUTO DIFF WBC: CPT | Performed by: EMERGENCY MEDICINE

## 2017-04-17 PROCEDURE — 93005 ELECTROCARDIOGRAM TRACING: CPT

## 2017-04-17 PROCEDURE — 83735 ASSAY OF MAGNESIUM: CPT | Performed by: EMERGENCY MEDICINE

## 2017-04-17 PROCEDURE — 93010 ELECTROCARDIOGRAM REPORT: CPT | Performed by: INTERNAL MEDICINE

## 2017-04-17 PROCEDURE — 80053 COMPREHEN METABOLIC PANEL: CPT | Performed by: EMERGENCY MEDICINE

## 2017-04-17 PROCEDURE — 93005 ELECTROCARDIOGRAM TRACING: CPT | Performed by: EMERGENCY MEDICINE

## 2017-04-17 PROCEDURE — 84484 ASSAY OF TROPONIN QUANT: CPT | Performed by: EMERGENCY MEDICINE

## 2017-04-17 PROCEDURE — 85610 PROTHROMBIN TIME: CPT | Performed by: EMERGENCY MEDICINE

## 2017-04-17 RX ORDER — METOPROLOL SUCCINATE 25 MG/1
50 TABLET, EXTENDED RELEASE ORAL DAILY
Qty: 14 TABLET | Refills: 0 | OUTPATIENT
Start: 2017-04-17 | End: 2019-03-05

## 2017-04-17 RX ORDER — SODIUM CHLORIDE 0.9 % (FLUSH) 0.9 %
10 SYRINGE (ML) INJECTION AS NEEDED
Status: DISCONTINUED | OUTPATIENT
Start: 2017-04-17 | End: 2017-04-17 | Stop reason: HOSPADM

## 2017-04-17 NOTE — ED PROVIDER NOTES
EMERGENCY DEPARTMENT ENCOUNTER    CHIEF COMPLAINT  Chief Complaint: Palpitations  History given by: Pt  History limited by: Poor historian  Room Number: 20/20  PMD: Kit Gonzalez MD      HPI:  Pt is a 56 y.o. female who presents complaining of rapid palpitations over the past hour. Pt also c/o SOA. She denies chest pain, nausea, vomiting, diarrhea, cough, cold, fever, abd pain, and urinary symptoms. Dr. Tori Momin is her Cardiologist. She reports she has a history of SVT, but is unsure of a history of A-fib/flutter. Pt states she takes her Metoprolol as prescribed and has not missed a dose recently. Pt denies tobacco, alcohol, or illicit drug use. She has a history of stroke with residual weakness in her R side.    Duration: 1 hour  Onset: Gradual  Timing: Constant  Location: Cardiovascular  Radiation: None  Quality: Rapid  Intensity/Severity: Moderate  Progression: Unchanged  Associated Symptoms: SOA  Aggravating Factors: Nothing  Alleviating Factors: Nothing  Previous Episodes: Yes  Treatment before arrival: Metoprolol    PAST MEDICAL HISTORY  Active Ambulatory Problems     Diagnosis Date Noted   • Degeneration of cervical intervertebral disc 05/31/2016   • Right spastic hemiparesis 05/31/2016   • Acute metabolic encephalopathy 11/29/2016     Resolved Ambulatory Problems     Diagnosis Date Noted   • No Resolved Ambulatory Problems     Past Medical History:   Diagnosis Date   • Diabetes mellitus    • Gout    • Hyperlipidemia    • Hypertension    • Stroke        PAST SURGICAL HISTORY  Past Surgical History:   Procedure Laterality Date   • SPINE SURGERY         FAMILY HISTORY  Family History   Problem Relation Age of Onset   • Hypertension Mother    • Diabetes Mother    • Ovarian cancer Mother    • Cancer Father        SOCIAL HISTORY  Social History     Social History   • Marital status: Single     Spouse name: N/A   • Number of children: N/A   • Years of education: N/A     Occupational History   • Not on  file.     Social History Main Topics   • Smoking status: Former Smoker     Packs/day: 0.25     Years: 15.00   • Smokeless tobacco: Never Used   • Alcohol use No   • Drug use: No   • Sexual activity: Not on file     Other Topics Concern   • Not on file     Social History Narrative       ALLERGIES  Metformin and related    REVIEW OF SYSTEMS  Review of Systems   Constitutional: Negative for chills and fever.   HENT: Negative for sore throat and trouble swallowing.    Eyes: Negative for visual disturbance.   Respiratory: Positive for shortness of breath. Negative for cough.    Cardiovascular: Positive for palpitations (rapid). Negative for chest pain and leg swelling.   Gastrointestinal: Negative for abdominal pain, diarrhea and vomiting.   Endocrine: Negative.    Genitourinary: Negative for decreased urine volume, dysuria and frequency.   Musculoskeletal: Negative for neck pain.   Skin: Negative for rash.   Allergic/Immunologic: Negative.    Neurological: Negative for syncope, weakness, numbness and headaches.   Hematological: Negative.    Psychiatric/Behavioral: Negative.    All other systems reviewed and are negative.      PHYSICAL EXAM  ED Triage Vitals   Temp Heart Rate Resp BP SpO2   04/17/17 0706 04/17/17 0706 04/17/17 0706 04/17/17 0720 04/17/17 0706   96.9 °F (36.1 °C) 174 18 169/106 96 %      Temp src Heart Rate Source Patient Position BP Location FiO2 (%)   04/17/17 0706 04/17/17 0706 -- -- --   Tympanic Monitor          Physical Exam   Constitutional: She is oriented to person, place, and time.  Non-toxic appearance. She appears distressed (mild).   HENT:   Head: Normocephalic and atraumatic.   Eyes: EOM are normal.   Neck: Normal range of motion. Neck supple.   Cardiovascular: Regular rhythm, normal heart sounds and intact distal pulses.  Tachycardia present.    No murmur heard.  Pulses:       Posterior tibial pulses are 2+ on the right side, and 2+ on the left side.   Pulmonary/Chest: Effort normal and  breath sounds normal. No tachypnea. No respiratory distress.   Abdominal: Soft. Bowel sounds are normal. There is no tenderness. There is no rebound and no guarding.   Musculoskeletal: She exhibits no edema.   Right hemiparesis with contractures of right upper ext and weakness of right lower leg   Neurological: She is alert and oriented to person, place, and time.   Pt has R hemiparesis   Skin: Skin is warm and dry.   Psychiatric: Affect normal.   Nursing note and vitals reviewed.      LAB RESULTS  Lab Results (last 24 hours)     Procedure Component Value Units Date/Time    CBC & Differential [[id number]] Collected:  04/17/17 0811    Specimen:  Blood Updated:  04/17/17 0821    Narrative:       The following orders were created for panel order CBC & Differential.  Procedure                               Abnormality         Status                     ---------                               -----------         ------                     CBC Auto Differential[[id number]]         Abnormal            Final result                 Please view results for these tests on the individual orders.    Comprehensive Metabolic Panel [[id number]]  (Abnormal) Collected:  04/17/17 0811    Specimen:  Blood Updated:  04/17/17 0848     Glucose 140 (H) mg/dL      BUN 15 mg/dL      Creatinine 0.92 mg/dL      Sodium 141 mmol/L      Potassium 4.0 mmol/L      Chloride 99 mmol/L      CO2 26.9 mmol/L      Calcium 9.2 mg/dL      Total Protein 7.3 g/dL      Albumin 4.20 g/dL      ALT (SGPT) 34 (H) U/L      AST (SGOT) 33 (H) U/L       Specimen hemolyzed.  Results may be affected.        Alkaline Phosphatase 53 U/L      Total Bilirubin 0.3 mg/dL      eGFR  African Amer 76 mL/min/1.73      Globulin 3.1 gm/dL      A/G Ratio 1.4 g/dL      BUN/Creatinine Ratio 16.3     Anion Gap 15.1 mmol/L     Magnesium [[id number]]  (Normal) Collected:  04/17/17 0811    Specimen:  Blood Updated:  04/17/17 0846     Magnesium 1.9 mg/dL     Protime-INR [[id number]]  (Abnormal)  Collected:  04/17/17 0811    Specimen:  Blood Updated:  04/17/17 0833     Protime 21.0 (H) Seconds      INR 1.89 (H)    Troponin [[id number]]  (Normal) Collected:  04/17/17 0811    Specimen:  Blood Updated:  04/17/17 0846     Troponin T <0.010 ng/mL     Narrative:       Troponin T Reference Ranges:  Less than 0.03 ng/mL:    Negative for AMI  0.03 to 0.09 ng/mL:      Indeterminant for AMI  Greater than 0.09 ng/mL: Positive for AMI    BNP [[id number]]  (Normal) Collected:  04/17/17 0811    Specimen:  Blood Updated:  04/17/17 0844     proBNP 19.9 pg/mL     Narrative:       Among patients with dyspnea, NT-proBNP is highly sensitive for the detection of acute congestive heart failure. In addition NT-proBNP of <300 pg/ml effectively rules out acute congestive heart failure with 99% negative predictive value.    CBC Auto Differential [[id number]]  (Abnormal) Collected:  04/17/17 0811    Specimen:  Blood Updated:  04/17/17 0821     WBC 6.35 10*3/mm3      RBC 5.77 (H) 10*6/mm3      Hemoglobin 14.4 g/dL      Hematocrit 44.7 %      MCV 77.5 (L) fL      MCH 25.0 (L) pg      MCHC 32.2 (L) g/dL      RDW 15.0 (H) %      RDW-SD 41.7 fl      MPV 11.3 fL      Platelets 368 10*3/mm3      Neutrophil % 55.7 %      Lymphocyte % 32.8 %      Monocyte % 7.1 %      Eosinophil % 3.8 %      Basophil % 0.6 %      Immature Grans % 0.0 %      Neutrophils, Absolute 3.54 10*3/mm3      Lymphocytes, Absolute 2.08 10*3/mm3      Monocytes, Absolute 0.45 10*3/mm3      Eosinophils, Absolute 0.24 10*3/mm3      Basophils, Absolute 0.04 10*3/mm3      Immature Grans, Absolute 0.00 10*3/mm3           I ordered the above labs and reviewed the results    RADIOLOGY  XR Chest 1 View   Final Result      CXR:  No active disease of the chest     I ordered the above noted radiological studies. Interpreted by radiologist. Discussed with radiologist. Reviewed by me in PACS.     EKG         EKG time: 7:13 AM  Rhythm/Rate: Wide complex tachycardia at 160's bpm  P waves and NE:  Unremarkable  QRS, axis: RBB pattern  ST and T waves: Unremarkable  Interpreted contemporaneously by me and independently viewed.  Changed compared to prior (Feb 2nd, 2017), pt was in NSR with RBBB at that time.    EKG         EKG time: 8:32 AM  Rhythm/Rate: NSR at 80's bpm  P waves and ID: Normal  QRS, axis: RBBB  ST and T waves: Normal  Interpreted contemporaneously by me and independently viewed.  Changed compared to prior EKG today, her SVT has resolved.    PROCEDURES  Procedures      PROGRESS AND CONSULTS  ED Course   7:31 AM:  Vitals: BP: (!) 169/106 HR: (!) 174 Temp: 96.9 °F (36.1 °C) (Tympanic) O2 sat: 96%  D/w pt results of EKG, which showed wide complex tachycardia, and the need to consult with Cardiology. Ordered labs and CXR for further evaluation. Pt understands and agrees with the plan, all questions answered.    7:38 AM:  Discuss[place]'s [place] with [place] [facility] discuss with Dr. Dickson and return my call.    8:00 AM:  Rechecked pt. Pt is resting comfortably and is in no distress. IV therapy is establishing a line.    8:19 AM:  Vitals: BP: (!) 143/103 HR: (!) 155 Temp: 96.9 °F (36.1 °C) (Tympanic) O2 sat: 96%  Rechecked pt. Pt is resting comfortably and is in no distress.     0819  Performed modified valsalva maneuver with cardioversion with resulting improvement in heart rate to 90's. Will repeat EKG. Pt understands and agrees with the plan, all questions answered.    MEDICAL DECISION MAKING  Results were reviewed/discussed with the patient and they were also made aware of online access. Pt also made aware that some labs, such as cultures, will not be resulted during ER visit and follow up with PMD is necessary.     MDM  Number of Diagnoses or Management Options  Essential hypertension:   SVT (supraventricular tachycardia):      Amount and/or Complexity of Data Reviewed  Clinical lab tests: reviewed and ordered (Troponin normal, INR 1.89, Glucose 140)  Tests in the radiology section of  CPT®: reviewed and ordered (CXR: negative acute)  Tests in the medicine section of CPT®: reviewed and ordered (EKG time: 7:13 AM  Rhythm/Rate: Wide complex tachycardia at 160's bpm  P waves and NC: Unremarkable  QRS, axis: RBB pattern  ST and T waves: Unremarkable  Interpreted contemporaneously by me and independently viewed.  Changed compared to prior (Feb 2nd, 2017), pt was in NSR with RBBB at that time.    EKG time: 8:32 AM  Rhythm/Rate: NSR at 80's bpm  P waves and NC: Normal  QRS, axis: RBBB  ST and T waves: Normal  Interpreted contemporaneously by me and independently viewed.  Changed compared to prior EKG today, her SVT has resolved.)  Discussion of test results with the performing providers: yes  Decide to obtain previous medical records or to obtain history from someone other than the patient: yes  Review and summarize past medical records: yes (Pt had a cardiac cath in March 2006 that showed normal coronaries and normal EF. In August 2013 pt was noted to have normal LV function. She had an ablation by Dr. Espinal (Cardiology) in 1999.)  Discuss the patient with other providers: yes (Vanesa with [facility])  Independent visualization of images, tracings, or specimens: yes    Patient Progress  Patient progress: stable         DIAGNOSIS  Final diagnoses:   SVT (supraventricular tachycardia)   Essential hypertension       DISPOSITION  DISCHARGE    Patient discharged in stable condition.    Reviewed implications of results, diagnosis, meds, responsibility to follow up, warning signs and symptoms of possible worsening, potential complications and reasons to return to ER, including any new or worsening symptoms.    Patient/Family voiced understanding of above instructions.    Discussed plan for discharge, as there is no emergent indication for admission.  Pt/family is agreeable and understands need for follow up and repeat testing.  Pt is aware that discharge does not mean that nothing is wrong but it indicates no emergency  is present that requires admission and they must continue care with follow-up as given below or physician of their choice.     FOLLOW-UP  Tori Momin MD  [address]  [facility] [id number] [phone]    Schedule an appointment as soon as possible for a visit in 3 days  EVEN IF WELL    Kit Gonzalez MD  [address]  [facility] [id number] [phone]    Schedule an appointment as soon as possible for a visit in 3 days  EVEN IF WELL         Medication List      Changed          colchicine 0.6 MG tablet   What changed:  Another medication with the same name was removed. Continue   taking this medication, and follow the directions you see here.       HYDROcodone-acetaminophen 5-325 MG per tablet   Commonly known as:  NORCO   What changed:  Another medication with the same name was removed. Continue   taking this medication, and follow the directions you see here.       metoprolol succinate XL 25 MG 24 hr tablet   Commonly known as:  TOPROL-XL   Take 2 tablets by mouth Daily.   What changed:    - how much to take  - when to take this         Stop          diclofenac 50 MG EC tablet   Commonly known as:  VOLTAREN       glimepiride 4 MG tablet   Commonly known as:  AMARYL             Latest Documented Vital Signs:  As of 9:09 AM  BP- 152/88 HR- 88 Temp- 96.9 °F (36.1 °C) (Tympanic) O2 sat- 94%    --  Documentation assistance provided by silva Prasad for Dr. Skaggs.  Information recorded by the scribe was done at my direction and has been verified and validated by me.     Abhinav Prasad  04/17/17 0909       Terri Skaggs MD  04/18/17 8594

## 2017-04-17 NOTE — DISCHARGE INSTRUCTIONS
You are advised to follow closely with Dr. Gonzalez and Dr. Tori Momin or cardiologist of your choice in 2-3 days for recheck, final results of lab work and imaging testing, and further testing/treatment as needed.  Avoid caffeine, decongestants or any stimulants.  increase Toprol to 50 mg daily    Please return to the emergency department immediately with chest pain different than usual for you, shortness of air, abdominal pain, persistent vomiting/fever, blood in emesis or stool, lightheadedness/fainting, problems with speech, one sided weakness/numbness, new incontinence, problems with vision, palpitations or for worsening of symptoms or other concerns.

## 2017-06-04 ENCOUNTER — HOSPITAL ENCOUNTER (EMERGENCY)
Facility: HOSPITAL | Age: 57
Discharge: HOME | End: 2017-06-04
Attending: EMERGENCY MEDICINE | Admitting: EMERGENCY MEDICINE

## 2017-06-04 VITALS
WEIGHT: 220 LBS | RESPIRATION RATE: 16 BRPM | OXYGEN SATURATION: 94 % | TEMPERATURE: 98.7 F | BODY MASS INDEX: 35.36 KG/M2 | HEIGHT: 66 IN | SYSTOLIC BLOOD PRESSURE: 140 MMHG | HEART RATE: 75 BPM | DIASTOLIC BLOOD PRESSURE: 89 MMHG

## 2017-06-04 DIAGNOSIS — I47.1 SVT (SUPRAVENTRICULAR TACHYCARDIA) (HCC): Primary | ICD-10-CM

## 2017-06-04 PROCEDURE — 99283 EMERGENCY DEPT VISIT LOW MDM: CPT

## 2017-06-04 PROCEDURE — 93010 ELECTROCARDIOGRAM REPORT: CPT | Performed by: INTERNAL MEDICINE

## 2017-06-04 PROCEDURE — 93005 ELECTROCARDIOGRAM TRACING: CPT | Performed by: EMERGENCY MEDICINE

## 2017-06-04 PROCEDURE — 93005 ELECTROCARDIOGRAM TRACING: CPT

## 2017-06-04 NOTE — ED PROVIDER NOTES
EMERGENCY DEPARTMENT ENCOUNTER    CHIEF COMPLAINT  Chief Complaint: palpitations  History given by: pt  History limited by: none  Room Number: 17/17  PMD: MD Dr. Merrill Mohamud, cardiologist.    HPI:  Pt is a 56 y.o. female who presents complaining of palpitations starting about 45 minutes ago. Pt denies CP, SOA, or dizziness. Pt states a Hx of palpitations. Pt states she had a previous episode 2 months ago, and saw Dr. Momin then. Pt states she take 25mg Toprol 1x times a day. Pt states she had an ablation 20 years ago, and a previous stroke with R-sided weakness.     Duration:  45 minutes  Onset: gradual  Timing: constant  Quality: palpitations  Intensity/Severity: moderate  Progression: unchanged  Associated Symptoms: none stated  Aggravating Factors: none stated  Alleviating Factors: none stated  Previous Episodes: Hx palpitations  Treatment before arrival: none    PAST MEDICAL HISTORY  Active Ambulatory Problems     Diagnosis Date Noted   • Degeneration of cervical intervertebral disc 05/31/2016   • Right spastic hemiparesis 05/31/2016   • Acute metabolic encephalopathy 11/29/2016     Resolved Ambulatory Problems     Diagnosis Date Noted   • No Resolved Ambulatory Problems     Past Medical History:   Diagnosis Date   • Diabetes mellitus    • Gout    • Hyperlipidemia    • Hypertension    • Stroke        PAST SURGICAL HISTORY  Past Surgical History:   Procedure Laterality Date   • SPINE SURGERY         FAMILY HISTORY  Family History   Problem Relation Age of Onset   • Hypertension Mother    • Diabetes Mother    • Ovarian cancer Mother    • Cancer Father        SOCIAL HISTORY  Social History     Social History   • Marital status: Single     Spouse name: N/A   • Number of children: N/A   • Years of education: N/A     Occupational History   • Not on file.     Social History Main Topics   • Smoking status: Former Smoker     Packs/day: 0.25     Years: 15.00   • Smokeless tobacco: Never Used   • Alcohol  use No   • Drug use: No   • Sexual activity: Not on file     Other Topics Concern   • Not on file     Social History Narrative       ALLERGIES  Metformin and related and Sulfa antibiotics    REVIEW OF SYSTEMS  Review of Systems   Respiratory: Negative for shortness of breath.    Cardiovascular: Positive for palpitations. Negative for chest pain.   Neurological: Negative for dizziness.       PHYSICAL EXAM  ED Triage Vitals   Temp Heart Rate Resp BP SpO2   -- 06/04/17 0803 06/04/17 0803 -- 06/04/17 0803    174 24  97 %      Temp src Heart Rate Source Patient Position BP Location FiO2 (%)   -- -- -- -- --              Physical Exam   Constitutional: She is oriented to person, place, and time and well-developed, well-nourished, and in no distress. No distress.   HENT:   Head: Normocephalic and atraumatic.   Eyes: EOM are normal. Pupils are equal, round, and reactive to light.   Neck: Normal range of motion. Neck supple.   Cardiovascular: Regular rhythm and normal heart sounds.  Tachycardia present.    Pulmonary/Chest: Effort normal and breath sounds normal. No respiratory distress.   Abdominal: Soft. There is no tenderness. There is no rebound and no guarding.   Musculoskeletal: Normal range of motion. She exhibits no edema.   Neurological: She is alert and oriented to person, place, and time. She has normal sensation and normal strength.   R-sided hemiparesis from previous CVA   Skin: Skin is warm and dry. No rash noted.   Psychiatric: Mood and affect normal.   Nursing note and vitals reviewed.        PROCEDURES  Procedures  EKG           EKG time: 0806  Rhythm/Rate:   P waves and NE: normal  QRS, axis: RBBB   ST and T waves: nonspecific ST changes     Interpreted Contemporaneously by me, independently viewed    EKG           EKG time: 0814  Rhythm/Rate: NSR 75  P waves and NE: normal  QRS, axis: RBBB   ST and T waves: no acute changes     Interpreted Contemporaneously by me, independently viewed  unchanged  compared to prior 4/17/17    0811  Attempted Valsalva w/o success  0812  Attempted a mild R carotid massage, pt immediately converted to a regular rhythm      PROGRESS AND CONSULTS  ED Course   0803  Ordered EKG for further evaluation  0813  Pt has converted to a regular rhythm  0816  Pt does not want any XRs or labs done, or current changes in her medications. Discussed plan to d/c pt with a f/u with Dr. Momin for medication review tomorrow. Pt understands and agrees with plan ,and all questions were answered.       MEDICAL DECISION MAKING  Results were reviewed/discussed with the patient and they were also made aware of online access. Pt also made aware that some labs, such as cultures, will not be resulted during ER visit and follow up with PMD is necessary.     MDM  Number of Diagnoses or Management Options  SVT (supraventricular tachycardia):      Amount and/or Complexity of Data Reviewed  Tests in the medicine section of CPT®: ordered and reviewed (See procedures section for EKG)    Patient Progress  Patient progress: stable         DIAGNOSIS  Final diagnoses:   SVT (supraventricular tachycardia)       DISPOSITION  DISCHARGE    Patient discharged in stable condition.    Reviewed implications of results, diagnosis, meds, responsibility to follow up, warning signs and symptoms of possible worsening, potential complications and reasons to return to ER.    Patient/Family voiced understanding of above instructions.    Discussed plan for discharge, as there is no emergent indication for admission.  Pt/family is agreeable and understands need for follow up and repeat testing.  Pt is aware that discharge does not mean that nothing is wrong but it indicates no emergency is present that requires admission and they must continue care with follow-up as given below or physician of their choice.     FOLLOW-UP  Tori Momin MD  [address] [id number] [phone]      call tomorrow to discuss  SVT and any medication changes         Medication List      Notice     No changes were made to your prescriptions during this visit.            Latest Documented Vital Signs:  As of 8:51 AM  BP- 140/89 HR- 75 Temp- 98.7 °F (37.1 °C) O2 sat- 94%    --  Documentation assistance provided by silva Obregon for Dr. Young.  Information recorded by the silva was done at my direction and has been verified and validated by me.     Ryao Obregon  06/04/17 0851       Power Young MD  06/04/17 0949

## 2017-06-04 NOTE — ED NOTES
"Pt states \"I think my SVT is coming back\". Pt c/o palpitations and shortness of breath.      Kirsten Lopez RN  06/04/17 0803    "

## 2017-07-07 ENCOUNTER — HOSPITAL ENCOUNTER (EMERGENCY)
Facility: HOSPITAL | Age: 57
Discharge: LEFT BEFORE DISCHARGE | End: 2017-07-07

## 2017-07-07 VITALS
BODY MASS INDEX: 36.16 KG/M2 | DIASTOLIC BLOOD PRESSURE: 79 MMHG | TEMPERATURE: 98.2 F | HEART RATE: 71 BPM | HEIGHT: 66 IN | WEIGHT: 225 LBS | RESPIRATION RATE: 20 BRPM | SYSTOLIC BLOOD PRESSURE: 124 MMHG | OXYGEN SATURATION: 95 %

## 2017-07-07 LAB
ALBUMIN SERPL-MCNC: 4.3 G/DL (ref 3.5–5.2)
ALBUMIN/GLOB SERPL: 1.4 G/DL
ALP SERPL-CCNC: 58 U/L (ref 39–117)
ALT SERPL W P-5'-P-CCNC: 30 U/L (ref 1–33)
ANION GAP SERPL CALCULATED.3IONS-SCNC: 12.5 MMOL/L
AST SERPL-CCNC: 24 U/L (ref 1–32)
BASOPHILS # BLD AUTO: 0.03 10*3/MM3 (ref 0–0.2)
BASOPHILS NFR BLD AUTO: 0.5 % (ref 0–1.5)
BILIRUB SERPL-MCNC: 0.2 MG/DL (ref 0.1–1.2)
BUN BLD-MCNC: 14 MG/DL (ref 6–20)
BUN/CREAT SERPL: 11.5 (ref 7–25)
CALCIUM SPEC-SCNC: 9.4 MG/DL (ref 8.6–10.5)
CHLORIDE SERPL-SCNC: 100 MMOL/L (ref 98–107)
CO2 SERPL-SCNC: 28.5 MMOL/L (ref 22–29)
CREAT BLD-MCNC: 1.22 MG/DL (ref 0.57–1)
DEPRECATED RDW RBC AUTO: 42.5 FL (ref 37–54)
EOSINOPHIL # BLD AUTO: 0.23 10*3/MM3 (ref 0–0.7)
EOSINOPHIL NFR BLD AUTO: 3.5 % (ref 0.3–6.2)
ERYTHROCYTE [DISTWIDTH] IN BLOOD BY AUTOMATED COUNT: 15.6 % (ref 11.7–13)
GFR SERPL CREATININE-BSD FRML MDRD: 55 ML/MIN/1.73
GLOBULIN UR ELPH-MCNC: 3 GM/DL
GLUCOSE BLD-MCNC: 146 MG/DL (ref 65–99)
HCT VFR BLD AUTO: 40.8 % (ref 35.6–45.5)
HGB BLD-MCNC: 13.4 G/DL (ref 11.9–15.5)
HOLD SPECIMEN: NORMAL
HOLD SPECIMEN: NORMAL
IMM GRANULOCYTES # BLD: 0 10*3/MM3 (ref 0–0.03)
IMM GRANULOCYTES NFR BLD: 0 % (ref 0–0.5)
LYMPHOCYTES # BLD AUTO: 2.25 10*3/MM3 (ref 0.9–4.8)
LYMPHOCYTES NFR BLD AUTO: 33.9 % (ref 19.6–45.3)
MCH RBC QN AUTO: 24.8 PG (ref 26.9–32)
MCHC RBC AUTO-ENTMCNC: 32.8 G/DL (ref 32.4–36.3)
MCV RBC AUTO: 75.4 FL (ref 80.5–98.2)
MONOCYTES # BLD AUTO: 0.41 10*3/MM3 (ref 0.2–1.2)
MONOCYTES NFR BLD AUTO: 6.2 % (ref 5–12)
NEUTROPHILS # BLD AUTO: 3.71 10*3/MM3 (ref 1.9–8.1)
NEUTROPHILS NFR BLD AUTO: 55.9 % (ref 42.7–76)
NRBC BLD MANUAL-RTO: 0 /100 WBC (ref 0–0)
PLATELET # BLD AUTO: 361 10*3/MM3 (ref 140–500)
PMV BLD AUTO: 11.1 FL (ref 6–12)
POTASSIUM BLD-SCNC: 3.5 MMOL/L (ref 3.5–5.2)
PROT SERPL-MCNC: 7.3 G/DL (ref 6–8.5)
RBC # BLD AUTO: 5.41 10*6/MM3 (ref 3.9–5.2)
SODIUM BLD-SCNC: 141 MMOL/L (ref 136–145)
WBC NRBC COR # BLD: 6.63 10*3/MM3 (ref 4.5–10.7)
WHOLE BLOOD HOLD SPECIMEN: NORMAL
WHOLE BLOOD HOLD SPECIMEN: NORMAL

## 2017-07-07 PROCEDURE — 99211 OFF/OP EST MAY X REQ PHY/QHP: CPT

## 2017-07-07 PROCEDURE — 85025 COMPLETE CBC W/AUTO DIFF WBC: CPT | Performed by: EMERGENCY MEDICINE

## 2017-07-07 PROCEDURE — 80053 COMPREHEN METABOLIC PANEL: CPT | Performed by: EMERGENCY MEDICINE

## 2017-07-07 PROCEDURE — 36415 COLL VENOUS BLD VENIPUNCTURE: CPT | Performed by: EMERGENCY MEDICINE

## 2017-07-08 ENCOUNTER — HOSPITAL ENCOUNTER (EMERGENCY)
Facility: HOSPITAL | Age: 57
Discharge: HOME | End: 2017-07-08
Attending: EMERGENCY MEDICINE | Admitting: EMERGENCY MEDICINE

## 2017-07-08 VITALS
WEIGHT: 225 LBS | DIASTOLIC BLOOD PRESSURE: 88 MMHG | TEMPERATURE: 96.7 F | HEIGHT: 66 IN | HEART RATE: 67 BPM | SYSTOLIC BLOOD PRESSURE: 139 MMHG | OXYGEN SATURATION: 91 % | BODY MASS INDEX: 36.16 KG/M2 | RESPIRATION RATE: 16 BRPM

## 2017-07-08 DIAGNOSIS — M79.604 CHRONIC PAIN OF RIGHT LOWER EXTREMITY: ICD-10-CM

## 2017-07-08 DIAGNOSIS — G89.29 CHRONIC PAIN OF RIGHT LOWER EXTREMITY: ICD-10-CM

## 2017-07-08 DIAGNOSIS — G89.29 CHRONIC PAIN OF RIGHT UPPER EXTREMITY: ICD-10-CM

## 2017-07-08 DIAGNOSIS — H10.9 CONJUNCTIVITIS OF RIGHT EYE, UNSPECIFIED CONJUNCTIVITIS TYPE: Primary | ICD-10-CM

## 2017-07-08 DIAGNOSIS — M79.601 CHRONIC PAIN OF RIGHT UPPER EXTREMITY: ICD-10-CM

## 2017-07-08 PROCEDURE — 99282 EMERGENCY DEPT VISIT SF MDM: CPT

## 2017-07-08 RX ORDER — ERYTHROMYCIN 5 MG/G
OINTMENT OPHTHALMIC
Qty: 1 G | Refills: 0 | OUTPATIENT
Start: 2017-07-08 | End: 2019-03-05

## 2017-07-08 NOTE — ED NOTES
Pt to Room 14 with c/o right eye drainage.  Pt does appear to have yellow/greenish drainage noted to the right eye.  Pt states that the eye does itch, but denies trauma to the area.  Pt is alert and oriented X4, PERRLA, respirations are even and unlabored, chest rise and fall is equal in expansion.  Pt does not appear to be in distress at this time.  Pt denies fever, chills, n/v/d, blurred vision, chest pain, abdominal pain, loss in control of bowel/bladder.  Bed in low position, call light within reach, side rails up X2.      Bernadette Patino RN  07/08/17 7497

## 2017-07-08 NOTE — ED PROVIDER NOTES
EMERGENCY DEPARTMENT ENCOUNTER    CHIEF COMPLAINT  Chief Complaint: facial swelling   History given by: patient   History limited by: n/a  Room Number: 14/14  PMD: iKt Gonzalez MD      HPI:  Pt is a 56 y.o. female who presents for evaluation of facial swelling. Pt was in the ED earlier for right sided facial swelling and right eye drainage and LWBS. Pt received a call stating that her labs were abnormal, and was told that she needed to be evaluated. Hx of CVA affecting the right side. Pt states that she is regaining sensation in her RLE, and it has been painful for the last 3 days.     Duration:  1 day  Onset: gradual  Timing: constant   Location: right facial  Radiation: none  Quality: swelling  Intensity/Severity: mild   Progression: unchanged   Associated Symptoms: eye drainage  Aggravating Factors: none  Alleviating Factors: none   Previous Episodes: none   Treatment before arrival: here yesterday, LWBS    PAST MEDICAL HISTORY  Active Ambulatory Problems     Diagnosis Date Noted   • Degeneration of cervical intervertebral disc 05/31/2016   • Right spastic hemiparesis 05/31/2016   • Acute metabolic encephalopathy 11/29/2016     Resolved Ambulatory Problems     Diagnosis Date Noted   • No Resolved Ambulatory Problems     Past Medical History:   Diagnosis Date   • Diabetes mellitus    • Gout    • Hyperlipidemia    • Hypertension    • Stroke        PAST SURGICAL HISTORY  Past Surgical History:   Procedure Laterality Date   • ATRIAL ABLATION SURGERY     • SPINE SURGERY         FAMILY HISTORY  Family History   Problem Relation Age of Onset   • Hypertension Mother    • Diabetes Mother    • Ovarian cancer Mother    • Cancer Father        SOCIAL HISTORY  Social History     Social History   • Marital status: Single     Spouse name: N/A   • Number of children: N/A   • Years of education: N/A     Occupational History   • Not on file.     Social History Main Topics   • Smoking status: Former Smoker     Packs/day:  0.25     Years: 15.00   • Smokeless tobacco: Never Used   • Alcohol use No   • Drug use: No   • Sexual activity: Not on file     Other Topics Concern   • Not on file     Social History Narrative       ALLERGIES  Metformin and related and Sulfa antibiotics    REVIEW OF SYSTEMS  Review of Systems   Constitutional: Negative for chills and fever.   HENT: Positive for facial swelling (right periorbital). Negative for sore throat and trouble swallowing.    Eyes: Positive for discharge (right). Negative for visual disturbance.   Respiratory: Negative for cough and shortness of breath.    Cardiovascular: Negative for chest pain, palpitations and leg swelling.   Gastrointestinal: Negative for abdominal pain, diarrhea and vomiting.   Endocrine: Negative.    Genitourinary: Negative for decreased urine volume, dysuria and frequency.   Musculoskeletal: Negative for neck pain.   Skin: Negative for rash.   Allergic/Immunologic: Negative.    Neurological: Negative for syncope, weakness, numbness and headaches.   Hematological: Negative.    Psychiatric/Behavioral: Negative.    All other systems reviewed and are negative.      PHYSICAL EXAM  ED Triage Vitals   Temp Heart Rate Resp BP SpO2   07/08/17 0029 07/08/17 0029 07/08/17 0029 07/08/17 0029 07/08/17 0029   97.2 °F (36.2 °C) 89 18 140/87 97 %      Temp src Heart Rate Source Patient Position BP Location FiO2 (%)   07/08/17 0029 07/08/17 0029 -- -- --   Tympanic Monitor          Physical Exam   Constitutional: She is oriented to person, place, and time and well-developed, well-nourished, and in no distress. No distress.   HENT:   Head: Normocephalic and atraumatic.   Eyes: EOM are normal. Pupils are equal, round, and reactive to light. Right eye exhibits discharge (yellow). Right conjunctiva is not injected.   Neck: Normal range of motion. Neck supple.   Cardiovascular: Normal rate, regular rhythm and normal heart sounds.    Pulmonary/Chest: Effort normal and breath sounds normal.  No respiratory distress.   Abdominal: Soft. There is no tenderness. There is no rebound and no guarding.   Musculoskeletal: Normal range of motion. She exhibits no edema.   Neurological: She is alert and oriented to person, place, and time.   Chronic RUE and RLE weakness secondary to CVA   Skin: Skin is warm and dry. No rash noted.   Psychiatric: Mood and affect normal.   Nursing note and vitals reviewed.      LAB RESULTS  Lab Results (last 24 hours)     ** No results found for the last 24 hours. **          I ordered the above labs and reviewed the results  PROCEDURES  Procedures      PROGRESS AND CONSULTS  ED Course     02:42  BP- 140/87 HR- 89 Temp- 97.2 °F (36.2 °C) (Tympanic) O2 sat- 97%  Advised pt that the abnormal lab mentioned was her Creatinine, which was only minimally elevated. Plan to treat right eye sx with abx eye drops. Pt will be discharged. Pt understands and agrees with the plan, all questions answered.    MEDICAL DECISION MAKING  Results were reviewed/discussed with the patient and they were also made aware of online access. Pt also made aware that some labs, such as cultures, will not be resulted during ER visit and follow up with PMD is necessary.     MDM  Number of Diagnoses or Management Options  Patient Progress  Patient progress: stable         DIAGNOSIS  Final diagnoses:   Conjunctivitis of right eye, unspecified conjunctivitis type   Chronic pain of right upper extremity   Chronic pain of right lower extremity       DISPOSITION  DISCHARGE    Patient discharged in stable condition.    Reviewed implications of results, diagnosis, meds, responsibility to follow up, warning signs and symptoms of possible worsening, potential complications and reasons to return to ER.    Patient/Family voiced understanding of above instructions.    Discussed plan for discharge, as there is no emergent indication for admission.  Pt/family is agreeable and understands need for follow up and repeat testing.  Pt  is aware that discharge does not mean that nothing is wrong but it indicates no emergency is present that requires admission and they must continue care with follow-up as given below or physician of their choice.     FOLLOW-UP  Kit Gonzalez MD  [address] [address]  [phone]    Schedule an appointment as soon as possible for a visit           Medication List      New Prescriptions          erythromycin 5 MG/GM ophthalmic ointment   Commonly known as:  ROMYCIN   Administer  to the right eye Every 4 (Four) Hours While Awake.           Latest Documented Vital Signs:  As of 10:56 PM  BP- 139/88 HR- 67 Temp- 96.7 °F (35.9 °C) (Tympanic) O2 sat- 91%    --  Documentation assistance provided by silva Johnson for Dr York.  Information recorded by the scribe was done at my direction and has been verified and validated by me.     Dayan Johnson  07/08/17 0258       Darin York MD  07/08/17 3657

## 2017-07-08 NOTE — ED NOTES
Pt came in earlier for right side facial swelling, right leg pain, and right eye drainage. Pt left and was called and informed her labs were abnormal and she returned to be evaluated.     Deion Valentine RN  07/08/17 0029

## 2017-07-16 ENCOUNTER — HOSPITAL ENCOUNTER (EMERGENCY)
Facility: HOSPITAL | Age: 57
Discharge: HOME | End: 2017-07-16
Attending: EMERGENCY MEDICINE | Admitting: EMERGENCY MEDICINE

## 2017-07-16 VITALS
SYSTOLIC BLOOD PRESSURE: 137 MMHG | DIASTOLIC BLOOD PRESSURE: 82 MMHG | WEIGHT: 225 LBS | OXYGEN SATURATION: 96 % | HEIGHT: 66 IN | BODY MASS INDEX: 36.16 KG/M2 | RESPIRATION RATE: 18 BRPM | TEMPERATURE: 99 F | HEART RATE: 90 BPM

## 2017-07-16 DIAGNOSIS — M10.9 GOUT OF BOTH FEET: Primary | ICD-10-CM

## 2017-07-16 DIAGNOSIS — Z91.14 NONCOMPLIANCE WITH MEDICATION REGIMEN: ICD-10-CM

## 2017-07-16 PROCEDURE — 99283 EMERGENCY DEPT VISIT LOW MDM: CPT

## 2017-07-16 RX ORDER — HYDROCODONE BITARTRATE AND ACETAMINOPHEN 7.5; 325 MG/1; MG/1
1 TABLET ORAL EVERY 4 HOURS PRN
Qty: 12 TABLET | Refills: 0 | Status: SHIPPED | OUTPATIENT
Start: 2017-07-16 | End: 2017-07-16

## 2017-07-16 RX ORDER — OXYCODONE HYDROCHLORIDE AND ACETAMINOPHEN 5; 325 MG/1; MG/1
1 TABLET ORAL ONCE
Status: COMPLETED | OUTPATIENT
Start: 2017-07-16 | End: 2017-07-16

## 2017-07-16 RX ORDER — COLCHICINE 0.6 MG/1
0.6 TABLET ORAL DAILY
Qty: 14 TABLET | Refills: 0 | Status: SHIPPED | OUTPATIENT
Start: 2017-07-16

## 2017-07-16 RX ADMIN — OXYCODONE HYDROCHLORIDE AND ACETAMINOPHEN 1 TABLET: 5; 325 TABLET ORAL at 22:50

## 2017-07-17 NOTE — ED PROVIDER NOTES
Pt presents to the ED c/o pain in bilateral feet and ankles secondary to gout. Pt reports a hx of gout, but has not been taking her Colchicine due to cost. On exam, there is tenderness to bilateral medial feet. I agree with the plan for discharge.    Attestation:  I supervised care provided by the midlevel provider.    We have discussed this patient's history, physical exam, and treatment plan.    I have reviewed the note and personally saw and examined the patient and agree with the plan of care.  I agree with the midlevel provider's findings and plan. I reviewed the midlevel providers note.     Documentation assistance provided by silva Johnson for Dr Xiao. Information recorded by the silva was done at my direction and has been verified and validated by me.     Dayan Johnson  07/16/17 8574       Beni Xiao MD  07/17/17 6952

## 2017-07-17 NOTE — DISCHARGE INSTRUCTIONS
PLEASE READ AND REVIEW ALL DISCHARGE INSTRUCTIONS.     Please follow up with your primary care physician for any further evaluation/treatment and further management of your blood pressure.     Recheck in emergency department for any worsening and/or concerning symptoms.     Take all prescribed medicine as written and continue chronic medication.      DO NOT DRIVE WHILE TAKING PAIN MEDICINE.

## 2017-07-17 NOTE — ED PROVIDER NOTES
EMERGENCY DEPARTMENT ENCOUNTER    CHIEF COMPLAINT  Chief Complaint: bilateral foot pain  History given by:patient  History limited by:nothing  Room Number: HALB/B  PMD: Kit Gonzalez MD      HPI:  Pt is a 57 y.o. female w/ hx of gout who presents with worsening bilateral foot pain onset last night. Pt states she has not been taking her colchicine due to cost, and does not take any other gout medication. She also c/o RLE swelling. She lives at home by herself.    Duration: 1 day  Timing:constant  Location:bilateral feet  Radiation:none  Quality:like previous gout flare-ups  Intensity/Severity:severe  Progression:worsening  Associated Symptoms:none  Aggravating Factors:none  Alleviating Factors:none  Previous Episodes:pt has hx of gout  Treatment before arrival:none    MEDICAL RECORD REVIEW    Pt takes Xarelto for hx of stroke. Also has hx of HTN, diabetes, gout. She takes colchicine daily. Hx of SVT w/ previous ablation and sees Dr. Gillespie, cardiology. Most recently seen for gout flare-up in 03/2017.      PAST MEDICAL HISTORY  Active Ambulatory Problems     Diagnosis Date Noted   • Degeneration of cervical intervertebral disc 05/31/2016   • Right spastic hemiparesis 05/31/2016   • Acute metabolic encephalopathy 11/29/2016     Resolved Ambulatory Problems     Diagnosis Date Noted   • No Resolved Ambulatory Problems     Past Medical History:   Diagnosis Date   • Diabetes mellitus    • Gout    • Hyperlipidemia    • Hypertension    • Stroke        PAST SURGICAL HISTORY  Past Surgical History:   Procedure Laterality Date   • ATRIAL ABLATION SURGERY     • SPINE SURGERY         FAMILY HISTORY  Family History   Problem Relation Age of Onset   • Hypertension Mother    • Diabetes Mother    • Ovarian cancer Mother    • Cancer Father        SOCIAL HISTORY  Social History     Social History   • Marital status: Single     Spouse name: N/A   • Number of children: N/A   • Years of education: N/A     Occupational History   •  Not on file.     Social History Main Topics   • Smoking status: Former Smoker     Packs/day: 0.25     Years: 15.00   • Smokeless tobacco: Never Used   • Alcohol use No   • Drug use: No   • Sexual activity: Not on file     Other Topics Concern   • Not on file     Social History Narrative       ALLERGIES  Metformin and related and Sulfa antibiotics    REVIEW OF SYSTEMS  Review of Systems   Constitutional: Negative for chills and fever.   HENT: Negative for sore throat.    Respiratory: Negative for cough.    Cardiovascular: Positive for leg swelling (R).   Gastrointestinal: Negative for nausea and vomiting.   Genitourinary: Negative for dysuria.   Musculoskeletal: Positive for myalgias (bilateral feet). Negative for back pain.   Psychiatric/Behavioral: The patient is not nervous/anxious.        PHYSICAL EXAM  ED Triage Vitals   Temp Heart Rate Resp BP SpO2   07/16/17 1821 07/16/17 1821 07/16/17 1821 07/16/17 1821 07/16/17 1821   99.1 °F (37.3 °C) 92 18 139/82 96 %      Temp src Heart Rate Source Patient Position BP Location FiO2 (%)   -- -- -- -- --              Physical Exam   Constitutional: She is oriented to person, place, and time and well-developed, well-nourished, and in no distress. No distress.   HENT:   Head: Normocephalic and atraumatic.   Eyes: EOM are normal.   Neck: Normal range of motion.   Pulmonary/Chest: Effort normal. No respiratory distress.   Musculoskeletal: She exhibits edema (chronic R lower leg and foot).        Right foot: There is tenderness.        Left foot: There is tenderness.   No redness or warmth to distal lower extremities. Tenderness to both feet w/ light touch. Normal bilateral calf exam.   Neurological: She is alert and oriented to person, place, and time.   Skin: Skin is warm and dry. No pallor.   Psychiatric: Mood, memory, affect and judgment normal.   Nursing note and vitals reviewed.      COURSE & MEDICAL DECISION MAKING  Pertinent Labs and Imaging studies that were ordered and  reviewed are noted above.  Results were reviewed/discussed with the patient and they were also made aware of online assess.  Pt also made aware that some labs, such as cultures, will not be resulted during ER visit and follow up with PMD is necessary.       PROGRESS AND CONSULTS    Progress Notes:    2136 D/w pt need to take colchicine to improve gout sx. Pt is very concerned about the cost of colchicine. Advised pt that she must take her medication for the sx to improve and stressed to pt that she needs help at home tonight as she would be a fall risk w/ pain medication. Pt agrees to fill her rx for colchicine and find someone to stay with her tonight.    2155 Rechecked pt who states she has a friend coming to to the ED who will take pt home and stay with her tonight.    2159 Reviewed pt's history and workup with Dr. Xiao.  After a bedside evaluation; Dr Xiao agrees with the plan of care. The patient's history, physical exam, and lab findings were discussed with the physician, who also performed a face to face history and physical exam.  I discussed all results and noted any abnormalities with patient.  Discussed absoute need to recheck abnormalities with their family physician.  I answered any of the patient's questions.  Discussed plan for discharge, as there is no emergent indication for admission.  Pt is agreeable and understands need for follow up and repeat testing.  Pt is aware that discharge does not mean that nothing is wrong but it indicates no emergency is present and they must continue care with their family physician.  Pt is discharged with instructions to follow up with primary care doctor to have their blood pressure rechecked.     2213 Rechecked pt who now has friend at bedside to take pt home. D/w pt and friend plan to d/c w/ pain medication and rx for colchicine. Friend will stay w/ pt this evening and until feeling better. Pt understands and agrees with the plan. All questions  "answered.      MEDICATIONS GIVEN IN ER  Medications - No data to display    /82  Pulse 92  Temp 99.1 °F (37.3 °C)  Resp 18  Ht 66\" (167.6 cm)  Wt 225 lb (102 kg)  SpO2 96%  BMI 36.32 kg/m2      DIAGNOSIS  Final diagnoses:   Gout of both feet   Noncompliance with medication regimen       FOLLOW UP   Kit Gonzalez MD  [address] [address]  [phone]    Call  For follow-up      RX     Medication List      Changed          * colchicine 0.6 MG tablet   What changed:  Another medication with the same name was added. Make sure   you understand how and when to take each.       * colchicine 0.6 MG tablet   Take 1 tablet by mouth Daily.   What changed:  Another medication with the same name was added. Make sure   you understand how and when to take each.       * colchicine 0.6 MG tablet   Take 1 tablet by mouth Daily.   What changed:  You were already taking a medication with the same name,   and this prescription was added. Make sure you understand how and when to   take each.       * HYDROcodone-acetaminophen 5-325 MG per tablet   Commonly known as:  NORCO   Take 1 tablet by mouth Every 4 (Four) Hours As Needed for Moderate Pain   (4-6).   What changed:    - Another medication with the same name was added. Make sure you   understand how and when to take each.  - Another medication with the same name was removed. Continue taking this   medication, and follow the directions you see here.       * HYDROcodone-acetaminophen 7.5-325 MG per tablet   Commonly known as:  NORCO   Take 1 tablet by mouth Every 4 (Four) Hours As Needed for Moderate Pain .   What changed:  You were already taking a medication with the same name,   and this prescription was added. Make sure you understand how and when to   take each.   Replaces:  HYDROcodone-acetaminophen 5-325 MG per tablet       * Notice:  This list has 5 medication(s) that are the same as other   medications prescribed for you. Read the " directions carefully, and ask   your doctor or other care provider to review them with you.      Stop          HYDROcodone-acetaminophen 5-325 MG per tablet   Commonly known as:  NORCO   Replaced by:  HYDROcodone-acetaminophen 7.5-325 MG per tablet   You also have another medication with the same name that you need to   continue taking as instructed.           I personally scribed for Shelbie Vuong PA-C on 7/16/2017 at 10:17 PM.  Electronically signed by Otto Brothers on 7/16/2017 at time 10:17 PM   Documentation assistance provided by silva Brothers.  Information recorded by the scribe was done at my direction and has been verified and validated by me.     Otto Brothers  07/16/17 2217       Shelbie Vuong PA-C  07/16/17 2218       Shelbie Vuong PA-C  08/28/17 2654

## 2017-07-17 NOTE — ED NOTES
Attempted to D/C pt, who requested pain medicine before she went home.  MARICHUY Morfin, approached, orders received.  Pt has ride home.     Augusta Jonas RN  07/16/17 [id number]

## 2017-10-11 ENCOUNTER — APPOINTMENT (OUTPATIENT)
Dept: GENERAL RADIOLOGY | Facility: HOSPITAL | Age: 57
End: 2017-10-11

## 2017-10-11 ENCOUNTER — APPOINTMENT (OUTPATIENT)
Dept: CT IMAGING | Facility: HOSPITAL | Age: 57
End: 2017-10-11

## 2017-10-11 ENCOUNTER — HOSPITAL ENCOUNTER (EMERGENCY)
Facility: HOSPITAL | Age: 57
Discharge: HOME | End: 2017-10-11
Attending: EMERGENCY MEDICINE | Admitting: EMERGENCY MEDICINE

## 2017-10-11 VITALS
BODY MASS INDEX: 36.96 KG/M2 | DIASTOLIC BLOOD PRESSURE: 82 MMHG | TEMPERATURE: 96 F | HEART RATE: 72 BPM | OXYGEN SATURATION: 93 % | HEIGHT: 66 IN | SYSTOLIC BLOOD PRESSURE: 152 MMHG | RESPIRATION RATE: 16 BRPM | WEIGHT: 230 LBS

## 2017-10-11 DIAGNOSIS — R42 DIZZINESS: Primary | ICD-10-CM

## 2017-10-11 LAB
ALBUMIN SERPL-MCNC: 4.6 G/DL (ref 3.5–5.2)
ALBUMIN/GLOB SERPL: 1.6 G/DL
ALP SERPL-CCNC: 37 U/L (ref 39–117)
ALT SERPL W P-5'-P-CCNC: 22 U/L (ref 1–33)
ANION GAP SERPL CALCULATED.3IONS-SCNC: 12.9 MMOL/L
AST SERPL-CCNC: 21 U/L (ref 1–32)
BACTERIA UR QL AUTO: ABNORMAL /HPF
BASOPHILS # BLD AUTO: 0.04 10*3/MM3 (ref 0–0.2)
BASOPHILS NFR BLD AUTO: 0.7 % (ref 0–1.5)
BILIRUB SERPL-MCNC: 0.2 MG/DL (ref 0.1–1.2)
BILIRUB UR QL STRIP: NEGATIVE
BUN BLD-MCNC: 13 MG/DL (ref 6–20)
BUN/CREAT SERPL: 11.9 (ref 7–25)
CALCIUM SPEC-SCNC: 9.4 MG/DL (ref 8.6–10.5)
CHLORIDE SERPL-SCNC: 101 MMOL/L (ref 98–107)
CLARITY UR: CLEAR
CO2 SERPL-SCNC: 26.1 MMOL/L (ref 22–29)
COLOR UR: YELLOW
CREAT BLD-MCNC: 1.09 MG/DL (ref 0.57–1)
DEPRECATED RDW RBC AUTO: 43.4 FL (ref 37–54)
EOSINOPHIL # BLD AUTO: 0.18 10*3/MM3 (ref 0–0.7)
EOSINOPHIL NFR BLD AUTO: 3.1 % (ref 0.3–6.2)
ERYTHROCYTE [DISTWIDTH] IN BLOOD BY AUTOMATED COUNT: 15.4 % (ref 11.7–13)
GFR SERPL CREATININE-BSD FRML MDRD: 62 ML/MIN/1.73
GLOBULIN UR ELPH-MCNC: 2.8 GM/DL
GLUCOSE BLD-MCNC: 143 MG/DL (ref 65–99)
GLUCOSE BLDC GLUCOMTR-MCNC: 183 MG/DL (ref 70–130)
GLUCOSE UR STRIP-MCNC: NEGATIVE MG/DL
HCT VFR BLD AUTO: 42.1 % (ref 35.6–45.5)
HGB BLD-MCNC: 13 G/DL (ref 11.9–15.5)
HGB UR QL STRIP.AUTO: NEGATIVE
HOLD SPECIMEN: NORMAL
HOLD SPECIMEN: NORMAL
HYALINE CASTS UR QL AUTO: ABNORMAL /LPF
IMM GRANULOCYTES # BLD: 0.02 10*3/MM3 (ref 0–0.03)
IMM GRANULOCYTES NFR BLD: 0.3 % (ref 0–0.5)
KETONES UR QL STRIP: NEGATIVE
LEUKOCYTE ESTERASE UR QL STRIP.AUTO: ABNORMAL
LYMPHOCYTES # BLD AUTO: 1.78 10*3/MM3 (ref 0.9–4.8)
LYMPHOCYTES NFR BLD AUTO: 30.4 % (ref 19.6–45.3)
MAGNESIUM SERPL-MCNC: 1.9 MG/DL (ref 1.6–2.6)
MCH RBC QN AUTO: 24.1 PG (ref 26.9–32)
MCHC RBC AUTO-ENTMCNC: 30.9 G/DL (ref 32.4–36.3)
MCV RBC AUTO: 78 FL (ref 80.5–98.2)
MONOCYTES # BLD AUTO: 0.37 10*3/MM3 (ref 0.2–1.2)
MONOCYTES NFR BLD AUTO: 6.3 % (ref 5–12)
NEUTROPHILS # BLD AUTO: 3.46 10*3/MM3 (ref 1.9–8.1)
NEUTROPHILS NFR BLD AUTO: 59.2 % (ref 42.7–76)
NITRITE UR QL STRIP: NEGATIVE
PH UR STRIP.AUTO: 6 [PH] (ref 5–8)
PLATELET # BLD AUTO: 414 10*3/MM3 (ref 140–500)
PMV BLD AUTO: 11.3 FL (ref 6–12)
POTASSIUM BLD-SCNC: 3.5 MMOL/L (ref 3.5–5.2)
PROT SERPL-MCNC: 7.4 G/DL (ref 6–8.5)
PROT UR QL STRIP: NEGATIVE
RBC # BLD AUTO: 5.4 10*6/MM3 (ref 3.9–5.2)
RBC # UR: ABNORMAL /HPF
REF LAB TEST METHOD: ABNORMAL
SODIUM BLD-SCNC: 140 MMOL/L (ref 136–145)
SP GR UR STRIP: 1.02 (ref 1–1.03)
SQUAMOUS #/AREA URNS HPF: ABNORMAL /HPF
TROPONIN T SERPL-MCNC: <0.01 NG/ML (ref 0–0.03)
UROBILINOGEN UR QL STRIP: ABNORMAL
WBC NRBC COR # BLD: 5.85 10*3/MM3 (ref 4.5–10.7)
WBC UR QL AUTO: ABNORMAL /HPF
WHOLE BLOOD HOLD SPECIMEN: NORMAL
WHOLE BLOOD HOLD SPECIMEN: NORMAL

## 2017-10-11 PROCEDURE — 82962 GLUCOSE BLOOD TEST: CPT

## 2017-10-11 PROCEDURE — 93005 ELECTROCARDIOGRAM TRACING: CPT | Performed by: EMERGENCY MEDICINE

## 2017-10-11 PROCEDURE — 99284 EMERGENCY DEPT VISIT MOD MDM: CPT

## 2017-10-11 PROCEDURE — 83735 ASSAY OF MAGNESIUM: CPT | Performed by: EMERGENCY MEDICINE

## 2017-10-11 PROCEDURE — 70450 CT HEAD/BRAIN W/O DYE: CPT

## 2017-10-11 PROCEDURE — 93010 ELECTROCARDIOGRAM REPORT: CPT | Performed by: INTERNAL MEDICINE

## 2017-10-11 PROCEDURE — 85025 COMPLETE CBC W/AUTO DIFF WBC: CPT | Performed by: EMERGENCY MEDICINE

## 2017-10-11 PROCEDURE — 36415 COLL VENOUS BLD VENIPUNCTURE: CPT | Performed by: EMERGENCY MEDICINE

## 2017-10-11 PROCEDURE — 81001 URINALYSIS AUTO W/SCOPE: CPT | Performed by: EMERGENCY MEDICINE

## 2017-10-11 PROCEDURE — 80053 COMPREHEN METABOLIC PANEL: CPT | Performed by: EMERGENCY MEDICINE

## 2017-10-11 PROCEDURE — 84484 ASSAY OF TROPONIN QUANT: CPT | Performed by: EMERGENCY MEDICINE

## 2017-10-11 RX ORDER — MECLIZINE HYDROCHLORIDE 25 MG/1
25 TABLET ORAL EVERY 6 HOURS PRN
Qty: 15 TABLET | Refills: 0 | Status: SHIPPED | OUTPATIENT
Start: 2017-10-11 | End: 2019-04-12 | Stop reason: SDUPTHER

## 2017-10-11 RX ORDER — SODIUM CHLORIDE 0.9 % (FLUSH) 0.9 %
10 SYRINGE (ML) INJECTION AS NEEDED
Status: DISCONTINUED | OUTPATIENT
Start: 2017-10-11 | End: 2017-10-11 | Stop reason: HOSPADM

## 2017-10-11 NOTE — ED PROVIDER NOTES
" EMERGENCY DEPARTMENT ENCOUNTER    CHIEF COMPLAINT  Chief Complaint: dizziness  History given by: Pt  History limited by: N/A  Room Number: 20/20  PMD: Kit Gonzalez MD      HPI:  Pt is a 57 y.o. female who presents with a h/o a previous stroke complaining of dizziness since 0930 this am after taking her medications at home. Pt describes her symptoms as feeling lightheaded and off balance, which worsened with standing up, and states she has had weakness to her R arm and R leg since her last stroke. Pt denies any nausea, vomiting, headache, chest pain, SOB, or palpitations, and was able to walk with her cane.     Duration:  Since 0930 this AM  Onset: gradual  Timing: constant  Quality: \" off balance\"  Intensity/Severity: moderate  Progression: unchanged  Associated Symptoms: lightheadedness  Aggravating Factors: standing up  Alleviating Factors: none  Previous Episodes: Pt has a h/o of a stroke.   Treatment before arrival: No treatment PTA.     PAST MEDICAL HISTORY  Active Ambulatory Problems     Diagnosis Date Noted   • Degeneration of cervical intervertebral disc 05/31/2016   • Right spastic hemiparesis 05/31/2016   • Acute metabolic encephalopathy 11/29/2016     Resolved Ambulatory Problems     Diagnosis Date Noted   • No Resolved Ambulatory Problems     Past Medical History:   Diagnosis Date   • Diabetes mellitus    • Gout    • Hyperlipidemia    • Hypertension    • Stroke        PAST SURGICAL HISTORY  Past Surgical History:   Procedure Laterality Date   • ATRIAL ABLATION SURGERY     • SPINE SURGERY         FAMILY HISTORY  Family History   Problem Relation Age of Onset   • Hypertension Mother    • Diabetes Mother    • Ovarian cancer Mother    • Cancer Father        SOCIAL HISTORY  Social History     Social History   • Marital status: Single     Spouse name: N/A   • Number of children: N/A   • Years of education: N/A     Occupational History   • Not on file.     Social History Main Topics   • Smoking status: " Former Smoker     Packs/day: 0.25     Years: 15.00   • Smokeless tobacco: Never Used   • Alcohol use No   • Drug use: No   • Sexual activity: Not on file     Other Topics Concern   • Not on file     Social History Narrative       ALLERGIES  Metformin and related and Sulfa antibiotics    REVIEW OF SYSTEMS  Review of Systems   Constitutional: Negative for fever.   HENT: Negative for sore throat.    Eyes: Negative.    Respiratory: Negative for cough and shortness of breath.    Cardiovascular: Negative for chest pain.   Gastrointestinal: Negative for abdominal pain, diarrhea and vomiting.   Genitourinary: Negative for dysuria.   Musculoskeletal: Negative for neck pain.   Skin: Negative for rash.   Allergic/Immunologic: Negative.    Neurological: Positive for dizziness and light-headedness. Negative for weakness, numbness and headaches.   Hematological: Negative.    Psychiatric/Behavioral: Negative.    All other systems reviewed and are negative.      PHYSICAL EXAM  ED Triage Vitals   Temp Heart Rate Resp BP SpO2   10/11/17 1201 10/11/17 1201 10/11/17 1201 10/11/17 1208 10/11/17 1201   96 °F (35.6 °C) 105 18 157/82 95 %      Temp src Heart Rate Source Patient Position BP Location FiO2 (%)   10/11/17 1201 10/11/17 1201 10/11/17 1208 10/11/17 1208 --   Tympanic Monitor Sitting Right arm        Physical Exam   Constitutional: She is oriented to person, place, and time and well-developed, well-nourished, and in no distress. No distress.   HENT:   Head: Normocephalic and atraumatic.   Eyes: EOM are normal. Pupils are equal, round, and reactive to light.   Neck: Normal range of motion. Neck supple.   Cardiovascular: Normal rate, regular rhythm and normal heart sounds.    Pulmonary/Chest: Effort normal and breath sounds normal. No respiratory distress.   Abdominal: Soft. There is no tenderness. There is no rebound and no guarding.   Musculoskeletal: Normal range of motion. She exhibits no edema.   Neurological: She is alert and  oriented to person, place, and time. She has normal sensation and normal strength. No cranial nerve deficit.   Pt has some chronic weakness in her R arm and leg from a prior stroke. Normal strength on the L side. Finger to nose and heel to shin was normal, and peech was also normal.    Skin: Skin is warm and dry. No rash noted.   Psychiatric: Mood and affect normal.   Nursing note and vitals reviewed.      LAB RESULTS  Lab Results (last 24 hours)     Procedure Component Value Units Date/Time    POC Glucose Fingerstick [[id number]]  (Abnormal) Collected:  10/11/17 1208    Specimen:  Blood Updated:  10/11/17 1210     Glucose 183 (H) mg/dL     Narrative:       Meter: [id number] : [id number] Mina Gold    CBC & Differential [[id number]] Collected:  10/11/17 1234    Specimen:  Blood Updated:  10/11/17 1256    Narrative:       The following orders were created for panel order CBC & Differential.  Procedure                               Abnormality         Status                     ---------                               -----------         ------                     CBC Auto Differential[[id number]]        Abnormal            Final result                 Please view results for these tests on the individual orders.    Comprehensive Metabolic Panel [[id number]]  (Abnormal) Collected:  10/11/17 1234    Specimen:  Blood Updated:  10/11/17 1313     Glucose 143 (H) mg/dL      BUN 13 mg/dL      Creatinine 1.09 (H) mg/dL      Sodium 140 mmol/L      Potassium 3.5 mmol/L      Chloride 101 mmol/L      CO2 26.1 mmol/L      Calcium 9.4 mg/dL      Total Protein 7.4 g/dL      Albumin 4.60 g/dL      ALT (SGPT) 22 U/L      AST (SGOT) 21 U/L      Alkaline Phosphatase 37 (L) U/L      Total Bilirubin 0.2 mg/dL      eGFR   Amer 62 mL/min/1.73      Globulin 2.8 gm/dL      A/G Ratio 1.6 g/dL      BUN/Creatinine Ratio 11.9     Anion Gap 12.9 mmol/L     Troponin [[id number]]  (Normal) Collected:  10/11/17 1234    Specimen:  Blood  Updated:  10/11/17 1318     Troponin T <0.010 ng/mL     Narrative:       Troponin T Reference Ranges:  Less than 0.03 ng/mL:    Negative for AMI  0.03 to 0.09 ng/mL:      Indeterminant for AMI  Greater than 0.09 ng/mL: Positive for AMI    Magnesium [[id number]]  (Normal) Collected:  10/11/17 1234    Specimen:  Blood Updated:  10/11/17 1313     Magnesium 1.9 mg/dL     CBC Auto Differential [[id number]]  (Abnormal) Collected:  10/11/17 1234    Specimen:  Blood Updated:  10/11/17 1256     WBC 5.85 10*3/mm3      RBC 5.40 (H) 10*6/mm3      Hemoglobin 13.0 g/dL      Hematocrit 42.1 %      MCV 78.0 (L) fL      MCH 24.1 (L) pg      MCHC 30.9 (L) g/dL      RDW 15.4 (H) %      RDW-SD 43.4 fl      MPV 11.3 fL      Platelets 414 10*3/mm3      Neutrophil % 59.2 %      Lymphocyte % 30.4 %      Monocyte % 6.3 %      Eosinophil % 3.1 %      Basophil % 0.7 %      Immature Grans % 0.3 %      Neutrophils, Absolute 3.46 10*3/mm3      Lymphocytes, Absolute 1.78 10*3/mm3      Monocytes, Absolute 0.37 10*3/mm3      Eosinophils, Absolute 0.18 10*3/mm3      Basophils, Absolute 0.04 10*3/mm3      Immature Grans, Absolute 0.02 10*3/mm3     Urinalysis With / Culture If Indicated - Urine, Clean Catch [[id number]]  (Abnormal) Collected:  10/11/17 1310    Specimen:  Urine from Urine, Clean Catch Updated:  10/11/17 1324     Color, UA Yellow     Appearance, UA Clear     pH, UA 6.0     Specific Gravity, UA 1.016     Glucose, UA Negative     Ketones, UA Negative     Bilirubin, UA Negative     Blood, UA Negative     Protein, UA Negative     Leuk Esterase, UA Small (1+) (A)     Nitrite, UA Negative     Urobilinogen, UA 1.0 E.U./dL    Urinalysis, Microscopic Only - Urine, Clean Catch [[id number]]  (Abnormal) Collected:  10/11/17 1310    Specimen:  Urine from Urine, Clean Catch Updated:  10/11/17 1324     RBC, UA 0-2 /HPF      WBC, UA 3-5 (A) /HPF      Bacteria, UA None Seen /HPF      Squamous Epithelial Cells, UA 0-2 /HPF      Hyaline Casts, UA None Seen  /LPF      Methodology Automated Microscopy          I ordered the above labs and reviewed the results    RADIOLOGY  CT Head Without Contrast    (Results Pending)        I ordered the above noted radiological studies. Interpreted by radiologist. Reviewed by me in PACS.       PROCEDURES  Procedures  EKG           EKG time: 1303  Rhythm/Rate: NSR, 67 BPM  P waves and CA: normal  QRS, axis: normal axis, RBBB  ST and T waves: nonspecific T wave changes     Interpreted Contemporaneously by me, independently viewed  Changed compared to prior 06/04/17, sinus tachycardia present at that time, no new ischemic changes.    PROGRESS AND CONSULTS  ED Course   Value Comment By Time   Creatinine: (!) 1.09 stable Hossein Lamas MD 10/11 1506     1224  Ordered blood work including CBC, UA, Magnesium, Troponin, and CMP, and EKG for further evaluation. Ordered IVF for hydration.   1311  Ordered CT head for further evaluation.   1509  Rechecked pt ho is resting comfortably and states she is improved, and denies any nausea. I discussed her blood work , and plan to discharge home and this time since her workup was negative. She understands and agrees with plan. All questions addressed.     MEDICAL DECISION MAKING  Results were reviewed/discussed with the patient and they were also made aware of online access. Pt also made aware that some labs, such as cultures, will not be resulted during ER visit and follow up with PMD is necessary.     MDM  Number of Diagnoses or Management Options  Dizziness:   Diagnosis management comments: Head CT showed a dense of an old stroke but nothing acute.  On exam, patient had chronic right sided weakness but no other abnormalities.  Her workup was unremarkable.       Amount and/or Complexity of Data Reviewed  Clinical lab tests: ordered and reviewed (Creatinine 1.09)  Tests in the radiology section of CPT®: ordered and reviewed (CT head - negative.  )  Tests in the medicine section of CPT®: reviewed and  ordered (See note. )  Decide to obtain previous medical records or to obtain history from someone other than the patient: yes  Review and summarize past medical records: yes (Pt was admitted to [facility] last month for chest pain,and had a stress test done that was negative. )  Independent visualization of images, tracings, or specimens: yes    Patient Progress  Patient progress: stable         DIAGNOSIS  Final diagnoses:   Dizziness       DISPOSITION  DISCHARGE    Patient discharged in stable condition.    Reviewed implications of results, diagnosis, meds, responsibility to follow up, warning signs and symptoms of possible worsening, potential complications and reasons to return to ER.     Patient/Family voiced understanding of above instructions.    Discussed plan for discharge, as there is no emergent indication for admission.  Pt/family is agreeable and understands need for follow up and repeat testing.  Pt is aware that discharge does not mean that nothing is wrong but it indicates no emergency is present that requires admission and they must continue care with follow-up as given below or physician of their choice.     FOLLOW-UP  Kit Gonzalez MD  [address] [address]  [phone]    Call in 2 days  If symptoms persist         Medication List      New Prescriptions          meclizine 25 MG tablet   Commonly known as:  ANTIVERT   Take 1 tablet by mouth Every 6 (Six) Hours As Needed for dizziness.         Changed          HYDROcodone-acetaminophen 5-325 MG per tablet   Commonly known as:  NORCO   What changed:  Another medication with the same name was removed. Continue   taking this medication, and follow the directions you see here.               Latest Documented Vital Signs:  As of 3:17 PM  BP- 152/82 HR- 68 Temp- 96 °F (35.6 °C) (Tympanic) O2 sat- 94%    --  Documentation assistance provided by silva Jackman for Dr.Damon Adams MD.  Information recorded by the  scribe was done at my direction and has been verified and validated by me.     Sonam Barbosa  10/11/17 1343       Sonam Barbosa  10/11/17 1518       Hossein Lamas MD  10/11/17 170

## 2017-10-11 NOTE — DISCHARGE INSTRUCTIONS
Take medication as prescribed.  Follow-up with your doctor the next several days if symptoms persist.  Return to emergency department for vomiting, worsening dizziness, headache, focal numbness or weakness, or other concern.

## 2019-01-04 ENCOUNTER — HOSPITAL ENCOUNTER (EMERGENCY)
Facility: HOSPITAL | Age: 59
Discharge: HOME | End: 2019-01-05
Attending: EMERGENCY MEDICINE | Admitting: EMERGENCY MEDICINE

## 2019-01-04 ENCOUNTER — APPOINTMENT (OUTPATIENT)
Dept: GENERAL RADIOLOGY | Facility: HOSPITAL | Age: 59
End: 2019-01-04

## 2019-01-04 DIAGNOSIS — R52 PAIN OF LEFT SIDE OF BODY: Primary | ICD-10-CM

## 2019-01-04 LAB
ALBUMIN SERPL-MCNC: 4.1 G/DL (ref 3.5–5.2)
ALBUMIN/GLOB SERPL: 1.7 G/DL
ALP SERPL-CCNC: 65 U/L (ref 39–117)
ALT SERPL W P-5'-P-CCNC: 29 U/L (ref 1–33)
ANION GAP SERPL CALCULATED.3IONS-SCNC: 11.6 MMOL/L
AST SERPL-CCNC: 25 U/L (ref 1–32)
BASOPHILS # BLD AUTO: 0.06 10*3/MM3 (ref 0–0.2)
BASOPHILS NFR BLD AUTO: 0.8 % (ref 0–1.5)
BILIRUB SERPL-MCNC: <0.2 MG/DL (ref 0.1–1.2)
BUN BLD-MCNC: 15 MG/DL (ref 6–20)
BUN/CREAT SERPL: 17.6 (ref 7–25)
CALCIUM SPEC-SCNC: 9 MG/DL (ref 8.6–10.5)
CHLORIDE SERPL-SCNC: 103 MMOL/L (ref 98–107)
CO2 SERPL-SCNC: 26.4 MMOL/L (ref 22–29)
CREAT BLD-MCNC: 0.85 MG/DL (ref 0.57–1)
DEPRECATED RDW RBC AUTO: 43 FL (ref 37–54)
EOSINOPHIL # BLD AUTO: 0.32 10*3/MM3 (ref 0–0.7)
EOSINOPHIL NFR BLD AUTO: 4.1 % (ref 0.3–6.2)
ERYTHROCYTE [DISTWIDTH] IN BLOOD BY AUTOMATED COUNT: 15.8 % (ref 11.7–13)
GFR SERPL CREATININE-BSD FRML MDRD: 83 ML/MIN/1.73
GLOBULIN UR ELPH-MCNC: 2.4 GM/DL
GLUCOSE BLD-MCNC: 193 MG/DL (ref 65–99)
HCT VFR BLD AUTO: 40.6 % (ref 35.6–45.5)
HGB BLD-MCNC: 13.1 G/DL (ref 11.9–15.5)
IMM GRANULOCYTES # BLD AUTO: 0.01 10*3/MM3 (ref 0–0.03)
IMM GRANULOCYTES NFR BLD AUTO: 0.1 % (ref 0–0.5)
LYMPHOCYTES # BLD AUTO: 2.16 10*3/MM3 (ref 0.9–4.8)
LYMPHOCYTES NFR BLD AUTO: 27.8 % (ref 19.6–45.3)
MCH RBC QN AUTO: 24.4 PG (ref 26.9–32)
MCHC RBC AUTO-ENTMCNC: 32.3 G/DL (ref 32.4–36.3)
MCV RBC AUTO: 75.7 FL (ref 80.5–98.2)
MONOCYTES # BLD AUTO: 0.6 10*3/MM3 (ref 0.2–1.2)
MONOCYTES NFR BLD AUTO: 7.7 % (ref 5–12)
NEUTROPHILS # BLD AUTO: 4.64 10*3/MM3 (ref 1.9–8.1)
NEUTROPHILS NFR BLD AUTO: 59.6 % (ref 42.7–76)
PLATELET # BLD AUTO: 369 10*3/MM3 (ref 140–500)
PMV BLD AUTO: 11.3 FL (ref 6–12)
POTASSIUM BLD-SCNC: 3.9 MMOL/L (ref 3.5–5.2)
PROT SERPL-MCNC: 6.5 G/DL (ref 6–8.5)
RBC # BLD AUTO: 5.36 10*6/MM3 (ref 3.9–5.2)
SODIUM BLD-SCNC: 141 MMOL/L (ref 136–145)
TROPONIN T SERPL-MCNC: <0.01 NG/ML (ref 0–0.03)
WBC NRBC COR # BLD: 7.78 10*3/MM3 (ref 4.5–10.7)

## 2019-01-04 PROCEDURE — 85025 COMPLETE CBC W/AUTO DIFF WBC: CPT

## 2019-01-04 PROCEDURE — 93010 ELECTROCARDIOGRAM REPORT: CPT | Performed by: INTERNAL MEDICINE

## 2019-01-04 PROCEDURE — 80053 COMPREHEN METABOLIC PANEL: CPT | Performed by: EMERGENCY MEDICINE

## 2019-01-04 PROCEDURE — 99284 EMERGENCY DEPT VISIT MOD MDM: CPT

## 2019-01-04 PROCEDURE — 93005 ELECTROCARDIOGRAM TRACING: CPT

## 2019-01-04 PROCEDURE — 84484 ASSAY OF TROPONIN QUANT: CPT

## 2019-01-04 PROCEDURE — 93005 ELECTROCARDIOGRAM TRACING: CPT | Performed by: EMERGENCY MEDICINE

## 2019-01-04 PROCEDURE — 71046 X-RAY EXAM CHEST 2 VIEWS: CPT

## 2019-01-04 PROCEDURE — 85379 FIBRIN DEGRADATION QUANT: CPT | Performed by: EMERGENCY MEDICINE

## 2019-01-04 RX ORDER — ASPIRIN 325 MG
325 TABLET ORAL ONCE
Status: COMPLETED | OUTPATIENT
Start: 2019-01-04 | End: 2019-01-05

## 2019-01-04 RX ORDER — SODIUM CHLORIDE 0.9 % (FLUSH) 0.9 %
10 SYRINGE (ML) INJECTION AS NEEDED
Status: DISCONTINUED | OUTPATIENT
Start: 2019-01-04 | End: 2019-01-05 | Stop reason: HOSPADM

## 2019-01-05 ENCOUNTER — APPOINTMENT (OUTPATIENT)
Dept: CT IMAGING | Facility: HOSPITAL | Age: 59
End: 2019-01-05

## 2019-01-05 VITALS
WEIGHT: 235.3 LBS | HEART RATE: 70 BPM | SYSTOLIC BLOOD PRESSURE: 157 MMHG | BODY MASS INDEX: 37.82 KG/M2 | HEIGHT: 66 IN | OXYGEN SATURATION: 97 % | TEMPERATURE: 98 F | RESPIRATION RATE: 16 BRPM | DIASTOLIC BLOOD PRESSURE: 78 MMHG

## 2019-01-05 LAB
D DIMER PPP FEU-MCNC: <0.27 MCGFEU/ML (ref 0–0.49)
HOLD SPECIMEN: NORMAL
HOLD SPECIMEN: NORMAL
WHOLE BLOOD HOLD SPECIMEN: NORMAL
WHOLE BLOOD HOLD SPECIMEN: NORMAL

## 2019-01-05 PROCEDURE — 70450 CT HEAD/BRAIN W/O DYE: CPT

## 2019-01-05 RX ORDER — HYDROCODONE BITARTRATE AND ACETAMINOPHEN 5; 325 MG/1; MG/1
1 TABLET ORAL EVERY 4 HOURS PRN
Qty: 20 TABLET | Refills: 0 | Status: SHIPPED | OUTPATIENT
Start: 2019-01-05

## 2019-01-05 RX ADMIN — ASPIRIN 325 MG: 325 TABLET ORAL at 00:20

## 2019-01-05 NOTE — ED NOTES
Pt reports CP, left arm pain, swelling to right side of face for about a week.     Magalys Momin RN  01/04/19 [id number]

## 2019-01-05 NOTE — ED NOTES
Pt complains of left arm numbness and tingling for about a week which she reports started in her fingers and has not moved up her arm. Pt states she then started having left sided CP for approximately a week as well. Pt reports she has slight SOA and fatigue. Pt also reports the right side of her face is swelling and she reports pain under her eyes in her cheeks. NSR noted with HR 79.  Pt had a previous stroke in 2010 with deficits noted on the right arm and leg weakness. Pt walks with a walker.     Bernadette York RN  01/04/19 8115       Bernadette York RN  01/04/19 2634

## 2019-01-05 NOTE — ED PROVIDER NOTES
EMERGENCY DEPARTMENT ENCOUNTER    CHIEF COMPLAINT  Chief Complaint: Left-sided pain  History given by: Pt  History limited by: none  Room Number: 15/15  PMD: Kit Gonzalez MD      HPI:  Pt is a 58 y.o. female, Hx of stroke with chronic right sided extremity weakness, who presents complaining of left-sided pain that began a week ago. Pt states the pain began on her fingers and moved up her arm and is now spread to her leg intermittently and the left side of her chest. Pt also c/o headaches for several months (unchanged) and mild occasional dry cough but denies fever or sore throat. Pt is on Coumadin. Pt states she doesn't check her blood pressure at home but is high when she has it checked at other locations. Pt states she currently isn't taking her [place]. Pt denies Hx of MI. Pt reports Hx of SVT.     Duration:  One week  Onset: gradual  Timing: constant (leg intermittent)  Location: left arm, left leg, left chest  Radiation: none  Quality: pain  Intensity/Severity: moderate  Progression: unchanged  Associated Symptoms: headaches for several months (unchanged) and mild occasional dry cough  Aggravating Factors: none  Alleviating Factors: none  Previous Episodes: none  Treatment before arrival: Pt is taking Coumadin.     PAST MEDICAL HISTORY  Active Ambulatory Problems     Diagnosis Date Noted   • Degeneration of cervical intervertebral disc 05/31/2016   • Right spastic hemiparesis (CMS/HCC) 05/31/2016   • Acute metabolic encephalopathy 11/29/2016     Resolved Ambulatory Problems     Diagnosis Date Noted   • No Resolved Ambulatory Problems     Past Medical History:   Diagnosis Date   • Diabetes mellitus (CMS/HCC)    • Gout    • Hyperlipidemia    • Hypertension    • Stroke (CMS/HCC)        PAST SURGICAL HISTORY  Past Surgical History:   Procedure Laterality Date   • ATRIAL ABLATION SURGERY     • SPINE SURGERY         FAMILY HISTORY  Family History   Problem Relation Age of Onset   • Hypertension Mother    •  Diabetes Mother    • Ovarian cancer Mother    • Cancer Father        SOCIAL HISTORY  Social History     Socioeconomic History   • Marital status: Single     Spouse name: Not on file   • Number of children: Not on file   • Years of education: Not on file   • Highest education level: Not on file   Social Needs   • Financial resource strain: Not on file   • Food insecurity - worry: Not on file   • Food insecurity - inability: Not on file   • Transportation needs - medical: Not on file   • Transportation needs - non-medical: Not on file   Occupational History   • Not on file   Tobacco Use   • Smoking status: Former Smoker     Packs/day: 0.25     Years: 15.00     Pack years: 3.75   • Smokeless tobacco: Never Used   Substance and Sexual Activity   • Alcohol use: No   • Drug use: No   • Sexual activity: Not on file   Other Topics Concern   • Not on file   Social History Narrative   • Not on file       ALLERGIES  Metformin and related and Sulfa antibiotics    REVIEW OF SYSTEMS  Review of Systems   Constitutional: Negative for fever.   HENT: Negative for sore throat.    Eyes: Negative.    Respiratory: Negative for cough and shortness of breath.    Cardiovascular: Positive for chest pain (left-sided ).   Gastrointestinal: Negative for abdominal pain, diarrhea and vomiting.   Genitourinary: Negative for dysuria.   Musculoskeletal: Positive for myalgias (left arm, left leg(intermittent)). Negative for neck pain.   Skin: Negative for rash.   Allergic/Immunologic: Negative.    Neurological: Negative for weakness, numbness and headaches.   Hematological: Negative.    Psychiatric/Behavioral: Negative.    All other systems reviewed and are negative.      PHYSICAL EXAM  ED Triage Vitals   Temp Heart Rate Resp BP SpO2   01/04/19 2152 01/04/19 2152 01/04/19 2152 01/04/19 2224 01/04/19 2152   98 °F (36.7 °C) 77 16 146/80 94 %      Temp src Heart Rate Source Patient Position BP Location FiO2 (%)   -- 01/04/19 2152 01/04/19 2324 01/04/19  2324 --    Monitor Lying Left arm        Physical Exam   Constitutional: She is oriented to person, place, and time. No distress.   HENT:   Head: Normocephalic and atraumatic.   Right Ear: External ear normal.   Left Ear: External ear normal.   Mouth/Throat: Oropharynx is clear and moist. No oropharyngeal exudate.   Eyes: EOM are normal. Pupils are equal, round, and reactive to light.   Neck: Normal range of motion. Neck supple.   Cardiovascular: Normal rate, regular rhythm and normal heart sounds.   Pulmonary/Chest: Effort normal and breath sounds normal. No respiratory distress. She has no wheezes. She has no rales. She exhibits tenderness (mild left lower).   Abdominal: Soft. Bowel sounds are normal. She exhibits no distension. There is no tenderness.   Musculoskeletal: Normal range of motion. She exhibits no edema or deformity (extremities w/o gross).   FROM of left arm and leg.   Neurological: She is alert and oriented to person, place, and time. She has normal sensation.   Chronic weakness of right arm and leg.    Skin: Skin is warm and dry. No rash noted.   Psychiatric: Mood and affect normal.   Nursing note and vitals reviewed.      LAB RESULTS  Lab Results (last 24 hours)     Procedure Component Value Units Date/Time    CBC & Differential [[id number]] Collected:  01/04/19 2316    Specimen:  Blood Updated:  01/04/19 2326    Narrative:       The following orders were created for panel order CBC & Differential.  Procedure                               Abnormality         Status                     ---------                               -----------         ------                     CBC Auto Differential[[id number]]        Abnormal            Final result                 Please view results for these tests on the individual orders.    Comprehensive Metabolic Panel [[id number]]  (Abnormal) Collected:  01/04/19 2316    Specimen:  Blood Updated:  01/04/19 2354     Glucose 193 mg/dL      BUN 15 mg/dL      Creatinine  0.85 mg/dL      Sodium 141 mmol/L      Potassium 3.9 mmol/L      Chloride 103 mmol/L      CO2 26.4 mmol/L      Calcium 9.0 mg/dL      Total Protein 6.5 g/dL      Albumin 4.10 g/dL      ALT (SGPT) 29 U/L      AST (SGOT) 25 U/L      Alkaline Phosphatase 65 U/L      Total Bilirubin <0.2 mg/dL      eGFR   Amer 83 mL/min/1.73      Globulin 2.4 gm/dL      A/G Ratio 1.7 g/dL      BUN/Creatinine Ratio 17.6     Anion Gap 11.6 mmol/L     Troponin [[id number]]  (Normal) Collected:  01/04/19 2316    Specimen:  Blood Updated:  01/04/19 2349     Troponin T <0.010 ng/mL     Narrative:       Troponin T Reference Ranges:  Less than 0.03 ng/mL:    Negative for AMI  0.03 to 0.09 ng/mL:      Indeterminant for AMI  Greater than 0.09 ng/mL: Positive for AMI    CBC Auto Differential [[id number]]  (Abnormal) Collected:  01/04/19 2316    Specimen:  Blood Updated:  01/04/19 2326     WBC 7.78 10*3/mm3      RBC 5.36 10*6/mm3      Hemoglobin 13.1 g/dL      Hematocrit 40.6 %      MCV 75.7 fL      MCH 24.4 pg      MCHC 32.3 g/dL      RDW 15.8 %      RDW-SD 43.0 fl      MPV 11.3 fL      Platelets 369 10*3/mm3      Neutrophil % 59.6 %      Lymphocyte % 27.8 %      Monocyte % 7.7 %      Eosinophil % 4.1 %      Basophil % 0.8 %      Immature Grans % 0.1 %      Neutrophils, Absolute 4.64 10*3/mm3      Lymphocytes, Absolute 2.16 10*3/mm3      Monocytes, Absolute 0.60 10*3/mm3      Eosinophils, Absolute 0.32 10*3/mm3      Basophils, Absolute 0.06 10*3/mm3      Immature Grans, Absolute 0.01 10*3/mm3     D-dimer, Quantitative [[id number]]  (Normal) Collected:  01/04/19 2316    Specimen:  Blood Updated:  01/05/19 0029     D-Dimer, Quantitative <0.27 MCGFEU/mL     Narrative:       The Stago D-Dimer test used in conjunction with a clinical pretest probability (PTP) assessment model, has been approved by the FDA to rule out the presence of venous thromboembolism (VTE) in outpatients suspected of deep venous thrombosis (DVT) or pulmonary embolism (PE).            I ordered the above labs and reviewed the results    RADIOLOGY  CT Head Without Contrast   Final Result   1. No acute intracranial abnormality.                           This report was finalized on 1/5/2019 1:38 AM by Otto Fox M.D.          XR Chest 2 View   Final Result   1. No active disease.       This report was finalized on 1/4/2019 11:16 PM by Otto Fox M.D.               I ordered the above noted radiological studies. Interpreted by radiologist. Reviewed by me in PACS.       PROCEDURES  Procedures    EKG          EKG time: 2201  Rhythm/Rate: NSR rate 88   P waves and CA: normal  QRS, axis: RBBB   ST and T waves: unremarkable     Interpreted Contemporaneously by me, independently viewed        PROGRESS AND CONSULTS       2244  Ordered lab work, EKG, CXR for further evaluation. Ordered full strength ASA.     0007  Ordered CT Head.     0149  Rechecked pt. Pt is resting comfortably. Notified pt of lab work, including negative troponin, negative D-Dimer, and CMP that shows normal BUN and creatinine, and CT Head that is negative acute. Discussed the plan to discharge the pt home with Rx for Norco. I instructed the pt to f/u with PCP for further evaluation and pain management. Pt understands and agrees with the plan, all questions answered.      MEDICAL DECISION MAKING  Results were reviewed/discussed with the patient and they were also made aware of online access. Pt also made aware that some labs, such as cultures, will not be resulted during ER visit and follow up with PMD is necessary.     MDM  Number of Diagnoses or Management Options  Pain of left side of body:      Amount and/or Complexity of Data Reviewed  Clinical lab tests: ordered and reviewed (Lab work is unremarkable. )  Tests in the radiology section of CPT®: ordered and reviewed (Imaging negative acute)  Tests in the medicine section of CPT®: ordered and reviewed (See EKG results in procedure. )  Independent visualization of images,  tracings, or specimens: yes           DIAGNOSIS  Final diagnoses:   Pain of left side of body       DISPOSITION  DISCHARGE    Patient discharged in stable condition.    Reviewed implications of results, diagnosis, meds, responsibility to follow up, warning signs and symptoms of possible worsening, potential complications and reasons to return to ER.    Patient/Family voiced understanding of above instructions.    Discussed plan for discharge, as there is no emergent indication for admission. Patient referred to primary care provider for BP management due to today's BP. Pt/family is agreeable and understands need for follow up and repeat testing.  Pt is aware that discharge does not mean that nothing is wrong but it indicates no emergency is present that requires admission and they must continue care with follow-up as given below or physician of their choice.     FOLLOW-UP  Kit Gonzalez MD  [address] [address]  [phone]    Schedule an appointment as soon as possible for a visit            Medication List      Changed    HYDROcodone-acetaminophen 5-325 MG per tablet  Commonly known as:  NORCO  Take 1 tablet by mouth Every 4 (Four) Hours As Needed for Moderate Pain .  What changed:  Another medication with the same name was removed. Continue   taking this medication, and follow the directions you see here.              Latest Documented Vital Signs:  As of 2:56 AM  BP- 157/78 HR- 70 Temp- 98 °F (36.7 °C) O2 sat- 97%    --  Documentation assistance provided by silva Bustos for Dr. Rodriguez.  Information recorded by the scribe was done at my direction and has been verified and validated by me.         Kit Bustos  01/05/19 0258       Jae Rodriguez MD  01/05/19 4962

## 2019-02-01 ENCOUNTER — HOSPITAL ENCOUNTER (EMERGENCY)
Facility: HOSPITAL | Age: 59
Discharge: HOME | End: 2019-02-01
Attending: EMERGENCY MEDICINE | Admitting: EMERGENCY MEDICINE

## 2019-02-01 ENCOUNTER — APPOINTMENT (OUTPATIENT)
Dept: GENERAL RADIOLOGY | Facility: HOSPITAL | Age: 59
End: 2019-02-01

## 2019-02-01 VITALS
WEIGHT: 230 LBS | SYSTOLIC BLOOD PRESSURE: 152 MMHG | BODY MASS INDEX: 36.96 KG/M2 | HEART RATE: 90 BPM | RESPIRATION RATE: 20 BRPM | OXYGEN SATURATION: 94 % | DIASTOLIC BLOOD PRESSURE: 85 MMHG | HEIGHT: 66 IN | TEMPERATURE: 97.3 F

## 2019-02-01 DIAGNOSIS — H65.03 BILATERAL ACUTE SEROUS OTITIS MEDIA, RECURRENCE NOT SPECIFIED: ICD-10-CM

## 2019-02-01 DIAGNOSIS — H66.90 ACUTE OTITIS MEDIA, UNSPECIFIED OTITIS MEDIA TYPE: Primary | ICD-10-CM

## 2019-02-01 LAB
FLUAV AG NPH QL: NEGATIVE
FLUBV AG NPH QL IA: NEGATIVE

## 2019-02-01 PROCEDURE — 71046 X-RAY EXAM CHEST 2 VIEWS: CPT

## 2019-02-01 PROCEDURE — 99283 EMERGENCY DEPT VISIT LOW MDM: CPT

## 2019-02-01 PROCEDURE — 87804 INFLUENZA ASSAY W/OPTIC: CPT | Performed by: EMERGENCY MEDICINE

## 2019-02-01 RX ORDER — AMOXICILLIN 500 MG/1
1000 CAPSULE ORAL 3 TIMES DAILY
Qty: 30 CAPSULE | Refills: 0 | OUTPATIENT
Start: 2019-02-01 | End: 2019-03-05

## 2019-02-02 NOTE — ED TRIAGE NOTES
Pt c/o cough, increased work of breathing, and generalized body aches mostly on R side with sore throat and L ear pain x 5 days.  Denies fever.

## 2019-02-02 NOTE — ED NOTES
Pt c/o right arm, jaw pain, sore throat, generalized body aches, and left ear pain that started 3-5days ago. Denies fever. Reports nonproductive cough. Pt has hx of stroke with paralysis of right arm and leg.      Megan Camp, RN  02/01/19 4221

## 2019-02-02 NOTE — ED PROVIDER NOTES
" EMERGENCY DEPARTMENT ENCOUNTER    CHIEF COMPLAINT  Chief Complaint: Sore throat  History given by: pt  History limited by: none  Room Number: 17/17  PMD: Kit Gonzalez MD      HPI:  Pt is a 58 y.o. female who presents complaining of sore throat today. Pt admits to dry cough, generalized body aches, and right ear pain, but denies fever.    Duration:  Today  Onset: gradual  Timing: constant  Location: throat  Radiation: none  Quality: \"sore\"  Intensity/Severity: moderate  Progression: unchanged  Associated Symptoms: dry cough, generalized body aches, right ear pain  Aggravating Factors: none  Alleviating Factors: none  Previous Episodes: none  Treatment before arrival: none    PAST MEDICAL HISTORY  Active Ambulatory Problems     Diagnosis Date Noted   • Degeneration of cervical intervertebral disc 05/31/2016   • Right spastic hemiparesis (CMS/HCC) 05/31/2016   • Acute metabolic encephalopathy 11/29/2016     Resolved Ambulatory Problems     Diagnosis Date Noted   • No Resolved Ambulatory Problems     Past Medical History:   Diagnosis Date   • Diabetes mellitus (CMS/HCC)    • Gout    • Hyperlipidemia    • Hypertension    • Stroke (CMS/HCC)        PAST SURGICAL HISTORY  Past Surgical History:   Procedure Laterality Date   • ATRIAL ABLATION SURGERY     • SPINE SURGERY         FAMILY HISTORY  Family History   Problem Relation Age of Onset   • Hypertension Mother    • Diabetes Mother    • Ovarian cancer Mother    • Cancer Father        SOCIAL HISTORY  Social History     Socioeconomic History   • Marital status: Single     Spouse name: Not on file   • Number of children: Not on file   • Years of education: Not on file   • Highest education level: Not on file   Social Needs   • Financial resource strain: Not on file   • Food insecurity - worry: Not on file   • Food insecurity - inability: Not on file   • Transportation needs - medical: Not on file   • Transportation needs - non-medical: Not on file   Occupational " History   • Not on file   Tobacco Use   • Smoking status: Former Smoker     Packs/day: 0.25     Years: 15.00     Pack years: 3.75   • Smokeless tobacco: Never Used   Substance and Sexual Activity   • Alcohol use: No   • Drug use: No   • Sexual activity: Not on file   Other Topics Concern   • Not on file   Social History Narrative   • Not on file       ALLERGIES  Metformin and related and Sulfa antibiotics    REVIEW OF SYSTEMS  Review of Systems   Constitutional: Negative for fever.   HENT: Positive for ear pain (right) and sore throat.    Eyes: Negative.    Respiratory: Positive for cough (dry). Negative for shortness of breath.    Cardiovascular: Negative for chest pain.   Gastrointestinal: Negative for abdominal pain, diarrhea and vomiting.   Genitourinary: Negative for dysuria.   Musculoskeletal: Positive for myalgias (generalized). Negative for neck pain.   Skin: Negative for rash.   Neurological: Negative for weakness, numbness and headaches.   Hematological: Negative.    Psychiatric/Behavioral: Negative.    All other systems reviewed and are negative.      PHYSICAL EXAM  ED Triage Vitals   Temp Heart Rate Resp BP SpO2   02/01/19 2051 02/01/19 2051 02/01/19 2051 02/01/19 2104 02/01/19 2051   97.3 °F (36.3 °C) 90 20 152/85 94 %      Temp src Heart Rate Source Patient Position BP Location FiO2 (%)   02/01/19 2051 02/01/19 2051 -- -- --   Tympanic Monitor          Physical Exam   Constitutional: She is oriented to person, place, and time. No distress.   HENT:   Head: Normocephalic and atraumatic.   Right Ear: Tympanic membrane is erythematous and bulging.   Left Ear: Tympanic membrane is erythematous and bulging.   Mouth/Throat: Oropharynx is clear and moist and mucous membranes are normal.   Right ear shows cerumen impaction   Eyes: EOM are normal. Pupils are equal, round, and reactive to light.   Neck: Normal range of motion. Neck supple.   Cardiovascular: Normal rate, regular rhythm and normal heart sounds.    Pulmonary/Chest: Effort normal and breath sounds normal. No respiratory distress.   Abdominal: Soft. There is no tenderness. There is no rebound and no guarding.   Musculoskeletal: Normal range of motion. She exhibits no edema.   Neurological: She is alert and oriented to person, place, and time. She has normal sensation and normal strength. She displays weakness (right sided (chronic)).   Skin: Skin is warm and dry. No rash noted.   Psychiatric: Mood and affect normal.   Nursing note and vitals reviewed.      LAB RESULTS  Lab Results (last 24 hours)     Procedure Component Value Units Date/Time    Influenza Antigen, Rapid - Swab, Nasopharynx [[id number]]  (Normal) Collected:  02/01/19 2124    Specimen:  Swab from Nasopharynx Updated:  02/01/19 2159     Influenza A Ag, EIA Negative     Influenza B Ag, EIA Negative          I ordered the above labs and reviewed the results    RADIOLOGY  XR Chest 2 View        NAD        I ordered the above noted radiological studies. Interpreted by radiologist. Reviewed by me in PACS.       PROCEDURES  Procedures      PROGRESS AND CONSULTS        2117  Ordered flu swab and chest XR for further viewing.     2218  Discussed that pt has bilateral ear infections. Discussed plan to discharge with antibiotics. Pt understands and agrees with the plan. All questions have been answered.        MEDICAL DECISION MAKING  Results were reviewed/discussed with the patient and they were also made aware of online access. Pt also made aware that some labs, such as cultures, will not be resulted during ER visit and follow up with PMD is necessary.     MDM  Number of Diagnoses or Management Options  Acute otitis media, unspecified otitis media type:      Amount and/or Complexity of Data Reviewed  Clinical lab tests: ordered and reviewed (Negative for flu)  Tests in the radiology section of CPT®: ordered and reviewed (Chest XR - NAD)  Decide to obtain previous medical records or to obtain history from  someone other than the patient: yes (EPIC)           DIAGNOSIS  Final diagnoses:   Acute otitis media, unspecified otitis media type   Bilateral acute serous otitis media, recurrence not specified       DISPOSITION  DISCHARGE    Patient discharged in stable condition.    Reviewed implications of results, diagnosis, meds, responsibility to follow up, warning signs and symptoms of possible worsening, potential complications and reasons to return to ER.    Patient/Family voiced understanding of above instructions.    Discussed plan for discharge, as there is no emergent indication for admission. Patient referred to primary care provider for BP management due to today's BP. Pt/family is agreeable and understands need for follow up and repeat testing.  Pt is aware that discharge does not mean that nothing is wrong but it indicates no emergency is present that requires admission and they must continue care with follow-up as given below or physician of their choice.     FOLLOW-UP  Kit Gonzalez MD  [address] [address]  [phone]    Call            Medication List      New Prescriptions    amoxicillin 500 MG capsule  Commonly known as:  AMOXIL  Take 2 capsules by mouth 3 (Three) Times a Day.              Latest Documented Vital Signs:  As of 10:37 PM  BP- 152/85 HR- 90 Temp- 97.3 °F (36.3 °C) (Tympanic) O2 sat- 94%    --  Documentation assistance provided by silva Davila for Dr Lopez.  Information recorded by the scribe was done at my direction and has been verified and validated by me.     Domenica Davila  02/01/19 2621       Peter Lopez MD  02/02/19 0033

## 2019-03-05 ENCOUNTER — APPOINTMENT (OUTPATIENT)
Dept: GENERAL RADIOLOGY | Facility: HOSPITAL | Age: 59
End: 2019-03-05

## 2019-03-05 ENCOUNTER — HOSPITAL ENCOUNTER (EMERGENCY)
Facility: HOSPITAL | Age: 59
Discharge: HOME | End: 2019-03-05
Attending: EMERGENCY MEDICINE | Admitting: NURSE PRACTITIONER

## 2019-03-05 VITALS
HEART RATE: 86 BPM | RESPIRATION RATE: 20 BRPM | BODY MASS INDEX: 36.96 KG/M2 | TEMPERATURE: 96.4 F | DIASTOLIC BLOOD PRESSURE: 82 MMHG | HEIGHT: 66 IN | OXYGEN SATURATION: 95 % | SYSTOLIC BLOOD PRESSURE: 173 MMHG | WEIGHT: 230 LBS

## 2019-03-05 DIAGNOSIS — R53.1 GENERALIZED WEAKNESS: Primary | ICD-10-CM

## 2019-03-05 LAB
ALBUMIN SERPL-MCNC: 4.5 G/DL (ref 3.5–5.2)
ALBUMIN/GLOB SERPL: 1.5 G/DL
ALP SERPL-CCNC: 63 U/L (ref 39–117)
ALT SERPL W P-5'-P-CCNC: 37 U/L (ref 1–33)
ANION GAP SERPL CALCULATED.3IONS-SCNC: 10 MMOL/L
APTT PPP: 37.8 SECONDS (ref 22.7–35.4)
AST SERPL-CCNC: 27 U/L (ref 1–32)
BASOPHILS # BLD AUTO: 0.05 10*3/MM3 (ref 0–0.2)
BASOPHILS NFR BLD AUTO: 0.7 % (ref 0–1.5)
BILIRUB SERPL-MCNC: 0.2 MG/DL (ref 0.1–1.2)
BILIRUB UR QL STRIP: NEGATIVE
BUN BLD-MCNC: 10 MG/DL (ref 6–20)
BUN/CREAT SERPL: 11 (ref 7–25)
CALCIUM SPEC-SCNC: 9.7 MG/DL (ref 8.6–10.5)
CHLORIDE SERPL-SCNC: 103 MMOL/L (ref 98–107)
CLARITY UR: CLEAR
CO2 SERPL-SCNC: 29 MMOL/L (ref 22–29)
COLOR UR: YELLOW
CREAT BLD-MCNC: 0.91 MG/DL (ref 0.57–1)
DEPRECATED RDW RBC AUTO: 42.5 FL (ref 37–54)
EOSINOPHIL # BLD AUTO: 0.22 10*3/MM3 (ref 0–0.4)
EOSINOPHIL NFR BLD AUTO: 3 % (ref 0.3–6.2)
ERYTHROCYTE [DISTWIDTH] IN BLOOD BY AUTOMATED COUNT: 15.4 % (ref 12.3–15.4)
GFR SERPL CREATININE-BSD FRML MDRD: 77 ML/MIN/1.73
GLOBULIN UR ELPH-MCNC: 3.1 GM/DL
GLUCOSE BLD-MCNC: 149 MG/DL (ref 65–99)
GLUCOSE UR STRIP-MCNC: NEGATIVE MG/DL
HCT VFR BLD AUTO: 45.3 % (ref 34–46.6)
HGB BLD-MCNC: 13.9 G/DL (ref 12–15.9)
HGB UR QL STRIP.AUTO: NEGATIVE
IMM GRANULOCYTES # BLD AUTO: 0.02 10*3/MM3 (ref 0–0.05)
IMM GRANULOCYTES NFR BLD AUTO: 0.3 % (ref 0–0.5)
INR PPP: 3.04 (ref 0.9–1.1)
KETONES UR QL STRIP: NEGATIVE
LEUKOCYTE ESTERASE UR QL STRIP.AUTO: NEGATIVE
LYMPHOCYTES # BLD AUTO: 1.87 10*3/MM3 (ref 0.7–3.1)
LYMPHOCYTES NFR BLD AUTO: 25.3 % (ref 19.6–45.3)
MCH RBC QN AUTO: 23.6 PG (ref 26.6–33)
MCHC RBC AUTO-ENTMCNC: 30.7 G/DL (ref 31.5–35.7)
MCV RBC AUTO: 77 FL (ref 79–97)
MONOCYTES # BLD AUTO: 0.56 10*3/MM3 (ref 0.1–0.9)
MONOCYTES NFR BLD AUTO: 7.6 % (ref 5–12)
NEUTROPHILS # BLD AUTO: 4.66 10*3/MM3 (ref 1.4–7)
NEUTROPHILS NFR BLD AUTO: 63.1 % (ref 42.7–76)
NITRITE UR QL STRIP: NEGATIVE
NRBC BLD AUTO-RTO: 0 /100 WBC (ref 0–0)
PH UR STRIP.AUTO: 7.5 [PH] (ref 5–8)
PLATELET # BLD AUTO: 382 10*3/MM3 (ref 140–450)
PMV BLD AUTO: 11.9 FL (ref 6–12)
POTASSIUM BLD-SCNC: 4 MMOL/L (ref 3.5–5.2)
PROT SERPL-MCNC: 7.6 G/DL (ref 6–8.5)
PROT UR QL STRIP: NEGATIVE
PROTHROMBIN TIME: 31 SECONDS (ref 11.7–14.2)
RBC # BLD AUTO: 5.88 10*6/MM3 (ref 3.77–5.28)
SODIUM BLD-SCNC: 142 MMOL/L (ref 136–145)
SP GR UR STRIP: 1.02 (ref 1–1.03)
TROPONIN T SERPL-MCNC: <0.01 NG/ML (ref 0–0.03)
UROBILINOGEN UR QL STRIP: NORMAL
WBC NRBC COR # BLD: 7.38 10*3/MM3 (ref 3.4–10.8)

## 2019-03-05 PROCEDURE — 85610 PROTHROMBIN TIME: CPT | Performed by: NURSE PRACTITIONER

## 2019-03-05 PROCEDURE — 84484 ASSAY OF TROPONIN QUANT: CPT | Performed by: NURSE PRACTITIONER

## 2019-03-05 PROCEDURE — 93005 ELECTROCARDIOGRAM TRACING: CPT | Performed by: NURSE PRACTITIONER

## 2019-03-05 PROCEDURE — 80053 COMPREHEN METABOLIC PANEL: CPT | Performed by: NURSE PRACTITIONER

## 2019-03-05 PROCEDURE — 85025 COMPLETE CBC W/AUTO DIFF WBC: CPT | Performed by: NURSE PRACTITIONER

## 2019-03-05 PROCEDURE — 71046 X-RAY EXAM CHEST 2 VIEWS: CPT

## 2019-03-05 PROCEDURE — 93010 ELECTROCARDIOGRAM REPORT: CPT | Performed by: INTERNAL MEDICINE

## 2019-03-05 PROCEDURE — 85730 THROMBOPLASTIN TIME PARTIAL: CPT | Performed by: NURSE PRACTITIONER

## 2019-03-05 PROCEDURE — 81003 URINALYSIS AUTO W/O SCOPE: CPT | Performed by: NURSE PRACTITIONER

## 2019-03-05 PROCEDURE — 99283 EMERGENCY DEPT VISIT LOW MDM: CPT

## 2019-03-05 RX ORDER — SODIUM CHLORIDE 0.9 % (FLUSH) 0.9 %
10 SYRINGE (ML) INJECTION AS NEEDED
Status: DISCONTINUED | OUTPATIENT
Start: 2019-03-05 | End: 2019-03-05 | Stop reason: HOSPADM

## 2019-03-05 RX ADMIN — SODIUM CHLORIDE 1000 ML: 9 INJECTION, SOLUTION INTRAVENOUS at 17:49

## 2019-03-05 NOTE — ED NOTES
Pt reports she feels dizzy and light-headed all day. Pt reports headache started 2-3 weeks ago. Pt denies any fever or chills. Pt denies any vomiting or diarrhea, nor difficulty urinating.     Carmine Musa RN  03/05/19 2229

## 2019-03-05 NOTE — ED PROVIDER NOTES
EMERGENCY DEPARTMENT ENCOUNTER    CHIEF COMPLAINT  Chief Complaint: Lightheadedness; generalized weakness  History given by:Pt  History limited by:none  Time Seen: 1700  Room Number: 43/43  PMD: Kit Gonzalez MD      HPI:  Pt is a 58 y.o. female who presents with light-headedness and generalized weakness that began this morning. Patient also complains of mild intermittent HA for 2-3 weeks.  Patient denies fever, chills, cough, CP, SOA, N/V/D,  Sx, decreased appetite, decreased PO intake. Pt states she stopped Xarelto due to financial situations and has been on coumadin for Hx of CVA. Pt reports chronic right sided weakness.   Past Medical History of DM,  A-fib with Heart ablation.     Duration: since this morning  Timing:constant  Location:generalized  Radiation:none  Quality:light-headed, weakness  Intensity/Severity:moderate  Progression:unchnaged  Associated Symptoms: mild intermittent HA for 2-3 weeks  Aggravating Factors:none  Alleviating Factors:none  Previous Episodes:none  Treatment before arrival:none    PAST MEDICAL HISTORY  Active Ambulatory Problems     Diagnosis Date Noted   • Degeneration of cervical intervertebral disc 05/31/2016   • Right spastic hemiparesis (CMS/HCC) 05/31/2016   • Acute metabolic encephalopathy 11/29/2016     Resolved Ambulatory Problems     Diagnosis Date Noted   • No Resolved Ambulatory Problems     Past Medical History:   Diagnosis Date   • Diabetes mellitus (CMS/HCC)    • Gout    • Hyperlipidemia    • Hypertension    • Stroke (CMS/HCC)        PAST SURGICAL HISTORY  Past Surgical History:   Procedure Laterality Date   • ATRIAL ABLATION SURGERY     • SPINE SURGERY         FAMILY HISTORY  Family History   Problem Relation Age of Onset   • Hypertension Mother    • Diabetes Mother    • Ovarian cancer Mother    • Cancer Father        SOCIAL HISTORY  Social History     Socioeconomic History   • Marital status: Single     Spouse name: Not on file   • Number of children: Not  on file   • Years of education: Not on file   • Highest education level: Not on file   Social Needs   • Financial resource strain: Not on file   • Food insecurity - worry: Not on file   • Food insecurity - inability: Not on file   • Transportation needs - medical: Not on file   • Transportation needs - non-medical: Not on file   Occupational History   • Not on file   Tobacco Use   • Smoking status: Former Smoker     Packs/day: 0.25     Years: 15.00     Pack years: 3.75     Types: Cigarettes     Last attempt to quit:      Years since quittin.1   • Smokeless tobacco: Never Used   Substance and Sexual Activity   • Alcohol use: No   • Drug use: No   • Sexual activity: Not on file   Other Topics Concern   • Not on file   Social History Narrative   • Not on file         ALLERGIES  Metformin and related and Sulfa antibiotics    REVIEW OF SYSTEMS  Review of Systems   Constitutional: Negative for activity change, appetite change, chills and fever.   HENT: Negative for sore throat.    Respiratory: Negative for shortness of breath.    Cardiovascular: Negative for chest pain.   Gastrointestinal: Negative for nausea and vomiting.   Genitourinary: Negative for dysuria.   Musculoskeletal: Negative for back pain.   Skin: Negative for rash.   Neurological: Positive for dizziness, weakness (generalized), light-headedness and headaches (for 2-3 weeks).   Psychiatric/Behavioral: The patient is not nervous/anxious.        PHYSICAL EXAM  ED Triage Vitals   Temp Heart Rate Resp BP SpO2   19 1645 19 1645 19 1645 19 1658 19 1645   96.4 °F (35.8 °C) 91 18 179/93 96 %       Physical Exam   Constitutional: She is well-developed, well-nourished, and in no distress.   HENT:   Head: Normocephalic.   Mouth/Throat: Mucous membranes are dry.   Eyes: Pupils are equal, round, and reactive to light. No scleral icterus.   Neck: Normal range of motion.   Cardiovascular: Normal rate, regular rhythm and normal heart  sounds.   Pulmonary/Chest: Effort normal and breath sounds normal. No respiratory distress. She has no wheezes. She has no rales.   Musculoskeletal: Normal range of motion.   Neurological: She is alert.   Weakness to right side secondary to previous CVA.    Skin: Skin is warm and dry.   Psychiatric: Mood and affect normal.   Nursing note and vitals reviewed.      LAB RESULTS  Recent Results (from the past 24 hour(s))   Urinalysis With Microscopic If Indicated (No Culture) - Urine, Clean Catch    Collection Time: 03/05/19  5:24 PM   Result Value Ref Range    Color, UA Yellow Yellow, Straw    Appearance, UA Clear Clear    pH, UA 7.5 5.0 - 8.0    Specific Gravity, UA 1.020 1.005 - 1.030    Glucose, UA Negative Negative    Ketones, UA Negative Negative    Bilirubin, UA Negative Negative    Blood, UA Negative Negative    Protein, UA Negative Negative    Leuk Esterase, UA Negative Negative    Nitrite, UA Negative Negative    Urobilinogen, UA 0.2 E.U./dL 0.2 - 1.0 E.U./dL   Comprehensive Metabolic Panel    Collection Time: 03/05/19  5:46 PM   Result Value Ref Range    Glucose 149 (H) 65 - 99 mg/dL    BUN 10 6 - 20 mg/dL    Creatinine 0.91 0.57 - 1.00 mg/dL    Sodium 142 136 - 145 mmol/L    Potassium 4.0 3.5 - 5.2 mmol/L    Chloride 103 98 - 107 mmol/L    CO2 29.0 22.0 - 29.0 mmol/L    Calcium 9.7 8.6 - 10.5 mg/dL    Total Protein 7.6 6.0 - 8.5 g/dL    Albumin 4.50 3.50 - 5.20 g/dL    ALT (SGPT) 37 (H) 1 - 33 U/L    AST (SGOT) 27 1 - 32 U/L    Alkaline Phosphatase 63 39 - 117 U/L    Total Bilirubin 0.2 0.1 - 1.2 mg/dL    eGFR  African Amer 77 >60 mL/min/1.73    Globulin 3.1 gm/dL    A/G Ratio 1.5 g/dL    BUN/Creatinine Ratio 11.0 7.0 - 25.0    Anion Gap 10.0 mmol/L   Protime-INR    Collection Time: 03/05/19  5:46 PM   Result Value Ref Range    Protime 31.0 (H) 11.7 - 14.2 Seconds    INR 3.04 (H) 0.90 - 1.10   aPTT    Collection Time: 03/05/19  5:46 PM   Result Value Ref Range    PTT 37.8 (H) 22.7 - 35.4 seconds   Troponin     Collection Time: 03/05/19  5:46 PM   Result Value Ref Range    Troponin T <0.010 0.000 - 0.030 ng/mL   CBC Auto Differential    Collection Time: 03/05/19  5:46 PM   Result Value Ref Range    WBC 7.38 3.40 - 10.80 10*3/mm3    RBC 5.88 (H) 3.77 - 5.28 10*6/mm3    Hemoglobin 13.9 12.0 - 15.9 g/dL    Hematocrit 45.3 34.0 - 46.6 %    MCV 77.0 (L) 79.0 - 97.0 fL    MCH 23.6 (L) 26.6 - 33.0 pg    MCHC 30.7 (L) 31.5 - 35.7 g/dL    RDW 15.4 12.3 - 15.4 %    RDW-SD 42.5 37.0 - 54.0 fl    MPV 11.9 6.0 - 12.0 fL    Platelets 382 140 - 450 10*3/mm3    Neutrophil % 63.1 42.7 - 76.0 %    Lymphocyte % 25.3 19.6 - 45.3 %    Monocyte % 7.6 5.0 - 12.0 %    Eosinophil % 3.0 0.3 - 6.2 %    Basophil % 0.7 0.0 - 1.5 %    Immature Grans % 0.3 0.0 - 0.5 %    Neutrophils, Absolute 4.66 1.40 - 7.00 10*3/mm3    Lymphocytes, Absolute 1.87 0.70 - 3.10 10*3/mm3    Monocytes, Absolute 0.56 0.10 - 0.90 10*3/mm3    Eosinophils, Absolute 0.22 0.00 - 0.40 10*3/mm3    Basophils, Absolute 0.05 0.00 - 0.20 10*3/mm3    Immature Grans, Absolute 0.02 0.00 - 0.05 10*3/mm3    nRBC 0.0 0.0 - 0.0 /100 WBC       I ordered the above labs and reviewed the results    RADIOLOGY  XR Chest 2 View    (Results Pending)     CXR negative acute.     I ordered the above noted radiological studies and reviewed the images on the PACS system.        EKG    ekg was interpreted by Dr. Mc, see his note for interpretation    PROGRESS AND CONSULTS      1924  Reviewed pt's history and workup with Dr. Mc.  At bedside evaluation, they agree with the plan of care. Unremarkable EKG, lab work, and CXR. Will discharge pt home.       Reviewed implications of results, diagnosis, meds, responsibility to follow up, warning signs and symptoms of possible worsening, potential complications and reasons to return to ER with patient.  Discussed all results and noted any abnormalities with patient.  Discussed absolute need to recheck abnormalities with PCP.    Discussed plan for  "discharge, as there is no emergent indication for admission.  Pt is agreeable and understands need for follow up and repeat testing.  Pt is aware that discharge does not mean that nothing is wrong but it indicates no emergency is present.  Pt is discharged with instructions to follow up with primary care doctor to have their blood pressure rechecked.       DIAGNOSIS  Final diagnoses:   Generalized weakness       FOLLOW UP   Kit Gonzalez MD  [address] [address]  [phone]    In 1 day          COURSE & MEDICAL DECISION MAKING  Pertinent Labs and Imaging studies that were ordered and reviewed are noted above.  Results were reviewed/discussed with the patient and they were also made aware of online assess.   Pt also made aware that some labs, such as cultures, will not be resulted during ER visit and follow up with PMD is necessary.     MEDICATIONS GIVEN IN ER  Medications   sodium chloride 0.9 % flush 10 mL (not administered)   sodium chloride 0.9 % bolus 1,000 mL (1,000 mL Intravenous New Bag 3/5/19 1749)       /94   Pulse 83   Temp 96.4 °F (35.8 °C) (Tympanic)   Resp 20   Ht 167.6 cm (66\")   Wt 104 kg (230 lb)   SpO2 97%   BMI 37.12 kg/m²       I personally reviewed the past medical history, past surgical history, social history, family history, current medications and allergies as they appear in this chart.  The scribe's note accurately reflects the work and decisions made by me.     Documentation assistance provided by silva Bustos for ROBERTA Yoder on 3/5/2019 at 5:00 PM. Information recorded by the scribe was done at my direction and has been verified and validated by me.                  Kit Bustos  03/05/19 1941       Jacquie Alejandro APRN  03/05/19 1946    "

## 2019-03-06 NOTE — DISCHARGE INSTRUCTIONS
Continue current home medications  Activity as tolerated  Follow up with PMD- call in am to schedule an appointment  Return to er for fever, chills, vomiting, chest pain, shortness of air, dizziness, or any new or worsening symptoms

## 2019-04-12 ENCOUNTER — APPOINTMENT (OUTPATIENT)
Dept: CT IMAGING | Facility: HOSPITAL | Age: 59
End: 2019-04-12

## 2019-04-12 ENCOUNTER — HOSPITAL ENCOUNTER (EMERGENCY)
Facility: HOSPITAL | Age: 59
Discharge: HOME | End: 2019-04-12
Attending: EMERGENCY MEDICINE | Admitting: EMERGENCY MEDICINE

## 2019-04-12 VITALS
WEIGHT: 230 LBS | TEMPERATURE: 98.2 F | DIASTOLIC BLOOD PRESSURE: 77 MMHG | BODY MASS INDEX: 36.96 KG/M2 | RESPIRATION RATE: 17 BRPM | HEART RATE: 78 BPM | OXYGEN SATURATION: 94 % | SYSTOLIC BLOOD PRESSURE: 134 MMHG | HEIGHT: 66 IN

## 2019-04-12 DIAGNOSIS — R42 DIZZINESS: Primary | ICD-10-CM

## 2019-04-12 LAB
ALBUMIN SERPL-MCNC: 4 G/DL (ref 3.5–5.2)
ALBUMIN/GLOB SERPL: 1.1 G/DL
ALP SERPL-CCNC: 49 U/L (ref 39–117)
ALT SERPL W P-5'-P-CCNC: 30 U/L (ref 1–33)
ANION GAP SERPL CALCULATED.3IONS-SCNC: 14.3 MMOL/L
AST SERPL-CCNC: 26 U/L (ref 1–32)
BASOPHILS # BLD AUTO: 0.05 10*3/MM3 (ref 0–0.2)
BASOPHILS NFR BLD AUTO: 0.6 % (ref 0–1.5)
BILIRUB SERPL-MCNC: <0.2 MG/DL (ref 0.2–1.2)
BUN BLD-MCNC: 17 MG/DL (ref 6–20)
BUN/CREAT SERPL: 16.3 (ref 7–25)
CALCIUM SPEC-SCNC: 9.7 MG/DL (ref 8.6–10.5)
CHLORIDE SERPL-SCNC: 102 MMOL/L (ref 98–107)
CO2 SERPL-SCNC: 25.7 MMOL/L (ref 22–29)
CREAT BLD-MCNC: 1.04 MG/DL (ref 0.57–1)
DEPRECATED RDW RBC AUTO: 44.4 FL (ref 37–54)
EOSINOPHIL # BLD AUTO: 0.27 10*3/MM3 (ref 0–0.4)
EOSINOPHIL NFR BLD AUTO: 3.4 % (ref 0.3–6.2)
ERYTHROCYTE [DISTWIDTH] IN BLOOD BY AUTOMATED COUNT: 16 % (ref 12.3–15.4)
GFR SERPL CREATININE-BSD FRML MDRD: 66 ML/MIN/1.73
GLOBULIN UR ELPH-MCNC: 3.5 GM/DL
GLUCOSE BLD-MCNC: 105 MG/DL (ref 65–99)
HCT VFR BLD AUTO: 43.6 % (ref 34–46.6)
HGB BLD-MCNC: 13.2 G/DL (ref 12–15.9)
IMM GRANULOCYTES # BLD AUTO: 0.02 10*3/MM3 (ref 0–0.05)
IMM GRANULOCYTES NFR BLD AUTO: 0.3 % (ref 0–0.5)
LYMPHOCYTES # BLD AUTO: 2.19 10*3/MM3 (ref 0.7–3.1)
LYMPHOCYTES NFR BLD AUTO: 27.7 % (ref 19.6–45.3)
MCH RBC QN AUTO: 23.9 PG (ref 26.6–33)
MCHC RBC AUTO-ENTMCNC: 30.3 G/DL (ref 31.5–35.7)
MCV RBC AUTO: 78.8 FL (ref 79–97)
MONOCYTES # BLD AUTO: 0.59 10*3/MM3 (ref 0.1–0.9)
MONOCYTES NFR BLD AUTO: 7.4 % (ref 5–12)
NEUTROPHILS # BLD AUTO: 4.8 10*3/MM3 (ref 1.4–7)
NEUTROPHILS NFR BLD AUTO: 60.6 % (ref 42.7–76)
NRBC BLD AUTO-RTO: 0 /100 WBC (ref 0–0)
PLATELET # BLD AUTO: 345 10*3/MM3 (ref 140–450)
PMV BLD AUTO: 11.8 FL (ref 6–12)
POTASSIUM BLD-SCNC: 4.2 MMOL/L (ref 3.5–5.2)
PROT SERPL-MCNC: 7.5 G/DL (ref 6–8.5)
RBC # BLD AUTO: 5.53 10*6/MM3 (ref 3.77–5.28)
SODIUM BLD-SCNC: 142 MMOL/L (ref 136–145)
TROPONIN T SERPL-MCNC: <0.01 NG/ML (ref 0–0.03)
WBC NRBC COR # BLD: 7.92 10*3/MM3 (ref 3.4–10.8)

## 2019-04-12 PROCEDURE — 93010 ELECTROCARDIOGRAM REPORT: CPT | Performed by: INTERNAL MEDICINE

## 2019-04-12 PROCEDURE — 93005 ELECTROCARDIOGRAM TRACING: CPT | Performed by: EMERGENCY MEDICINE

## 2019-04-12 PROCEDURE — 25010000002 ONDANSETRON PER 1 MG: Performed by: EMERGENCY MEDICINE

## 2019-04-12 PROCEDURE — 70450 CT HEAD/BRAIN W/O DYE: CPT

## 2019-04-12 PROCEDURE — 84484 ASSAY OF TROPONIN QUANT: CPT | Performed by: EMERGENCY MEDICINE

## 2019-04-12 PROCEDURE — 99284 EMERGENCY DEPT VISIT MOD MDM: CPT

## 2019-04-12 PROCEDURE — 96374 THER/PROPH/DIAG INJ IV PUSH: CPT

## 2019-04-12 PROCEDURE — 85025 COMPLETE CBC W/AUTO DIFF WBC: CPT | Performed by: EMERGENCY MEDICINE

## 2019-04-12 PROCEDURE — 80053 COMPREHEN METABOLIC PANEL: CPT | Performed by: EMERGENCY MEDICINE

## 2019-04-12 RX ORDER — SODIUM CHLORIDE 0.9 % (FLUSH) 0.9 %
10 SYRINGE (ML) INJECTION AS NEEDED
Status: DISCONTINUED | OUTPATIENT
Start: 2019-04-12 | End: 2019-04-12 | Stop reason: HOSPADM

## 2019-04-12 RX ORDER — ONDANSETRON 2 MG/ML
4 INJECTION INTRAMUSCULAR; INTRAVENOUS ONCE
Status: COMPLETED | OUTPATIENT
Start: 2019-04-12 | End: 2019-04-12

## 2019-04-12 RX ORDER — MECLIZINE HYDROCHLORIDE 25 MG/1
25 TABLET ORAL EVERY 6 HOURS PRN
Qty: 15 TABLET | Refills: 0 | Status: SHIPPED | OUTPATIENT
Start: 2019-04-12

## 2019-04-12 RX ORDER — PROMETHAZINE HYDROCHLORIDE 25 MG/ML
6.25 INJECTION, SOLUTION INTRAMUSCULAR; INTRAVENOUS ONCE
Status: DISCONTINUED | OUTPATIENT
Start: 2019-04-12 | End: 2019-04-12 | Stop reason: HOSPADM

## 2019-04-12 RX ADMIN — SODIUM CHLORIDE 500 ML: 9 INJECTION, SOLUTION INTRAVENOUS at 17:25

## 2019-04-12 RX ADMIN — ONDANSETRON 4 MG: 2 INJECTION INTRAMUSCULAR; INTRAVENOUS at 17:28

## 2019-04-12 NOTE — ED TRIAGE NOTES
Pt reports lower back pain and dizziness for several days. Pt states dizziness is worse with activity. Pt also reports rt side of body pain x 7 months. Pt is vague historian.

## 2019-04-13 NOTE — DISCHARGE INSTRUCTIONS
Take medication as prescribed.  Follow-up with your primary care doctor if symptoms persist.  Return to emergency department for worsening symptoms, nausea, vomiting, or other concern.

## 2019-04-14 NOTE — ED PROVIDER NOTES
MD ATTESTATION NOTE    The MAYO and I have discussed this patient's history, physical exam, and treatment plan.  I have reviewed the documentation and personally had a face to face interaction with the patient. I affirm the documentation and agree with the treatment and plan.  The attached note describes my personal findings.    Pt with increasing generalized weakness over the past several days. No recent fall. She denies any other sxs.    On exam, she is A&Ox3 and in NAD. Heart is RRR and lungs are CTAB. There is chronic weakness of the R extremity.     Blood work, EKG, and imaging unremarkable. Will discharge.    EKG          EKG time: 1728  Rhythm/Rate: NSR, 83BPM  P waves and PA: nml  QRS, axis: nml   ST and T waves: nml     Interpreted Contemporaneously by me, independently viewed  unchanged compared to prior 1/4/19      Documentation assistance provided by silva Cross for Dr. Mc.  Information recorded by the scribe was done at my direction and has been verified and validated by me.       Roque Cross  03/05/19 1924       Hossein Mc MD  03/05/19 2110     No

## 2019-04-18 ENCOUNTER — HOSPITAL ENCOUNTER (EMERGENCY)
Facility: HOSPITAL | Age: 59
Discharge: HOME | End: 2019-04-18
Attending: EMERGENCY MEDICINE | Admitting: EMERGENCY MEDICINE

## 2019-04-18 VITALS
OXYGEN SATURATION: 96 % | TEMPERATURE: 98.7 F | SYSTOLIC BLOOD PRESSURE: 147 MMHG | HEIGHT: 66 IN | WEIGHT: 234 LBS | DIASTOLIC BLOOD PRESSURE: 82 MMHG | HEART RATE: 80 BPM | BODY MASS INDEX: 37.61 KG/M2 | RESPIRATION RATE: 15 BRPM

## 2019-04-18 DIAGNOSIS — M79.10 MYALGIA: Primary | ICD-10-CM

## 2019-04-18 LAB
ALBUMIN SERPL-MCNC: 4.4 G/DL (ref 3.5–5.2)
ALBUMIN/GLOB SERPL: 1.6 G/DL
ALP SERPL-CCNC: 55 U/L (ref 39–117)
ALT SERPL W P-5'-P-CCNC: 30 U/L (ref 1–33)
ANION GAP SERPL CALCULATED.3IONS-SCNC: 11.7 MMOL/L
AST SERPL-CCNC: 21 U/L (ref 1–32)
BASOPHILS # BLD AUTO: 0.06 10*3/MM3 (ref 0–0.2)
BASOPHILS NFR BLD AUTO: 0.9 % (ref 0–1.5)
BILIRUB SERPL-MCNC: 0.2 MG/DL (ref 0.2–1.2)
BILIRUB UR QL STRIP: NEGATIVE
BUN BLD-MCNC: 16 MG/DL (ref 6–20)
BUN/CREAT SERPL: 17.6 (ref 7–25)
CALCIUM SPEC-SCNC: 9.3 MG/DL (ref 8.6–10.5)
CHLORIDE SERPL-SCNC: 102 MMOL/L (ref 98–107)
CK SERPL-CCNC: 302 U/L (ref 20–180)
CK SERPL-CCNC: 308 U/L (ref 20–180)
CLARITY UR: CLEAR
CO2 SERPL-SCNC: 28.3 MMOL/L (ref 22–29)
COLOR UR: YELLOW
CREAT BLD-MCNC: 0.91 MG/DL (ref 0.57–1)
DEPRECATED RDW RBC AUTO: 45.7 FL (ref 37–54)
EOSINOPHIL # BLD AUTO: 0.17 10*3/MM3 (ref 0–0.4)
EOSINOPHIL NFR BLD AUTO: 2.6 % (ref 0.3–6.2)
ERYTHROCYTE [DISTWIDTH] IN BLOOD BY AUTOMATED COUNT: 17.5 % (ref 12.3–15.4)
GFR SERPL CREATININE-BSD FRML MDRD: 77 ML/MIN/1.73
GLOBULIN UR ELPH-MCNC: 2.7 GM/DL
GLUCOSE BLD-MCNC: 78 MG/DL (ref 65–99)
GLUCOSE UR STRIP-MCNC: NEGATIVE MG/DL
HCT VFR BLD AUTO: 44.5 % (ref 34–46.6)
HGB BLD-MCNC: 13.3 G/DL (ref 12–15.9)
HGB UR QL STRIP.AUTO: NEGATIVE
IMM GRANULOCYTES # BLD AUTO: 0.01 10*3/MM3 (ref 0–0.05)
IMM GRANULOCYTES NFR BLD AUTO: 0.2 % (ref 0–0.5)
KETONES UR QL STRIP: NEGATIVE
LEUKOCYTE ESTERASE UR QL STRIP.AUTO: NEGATIVE
LYMPHOCYTES # BLD AUTO: 1.73 10*3/MM3 (ref 0.7–3.1)
LYMPHOCYTES NFR BLD AUTO: 26.4 % (ref 19.6–45.3)
MCH RBC QN AUTO: 23.5 PG (ref 26.6–33)
MCHC RBC AUTO-ENTMCNC: 29.9 G/DL (ref 31.5–35.7)
MCV RBC AUTO: 78.8 FL (ref 79–97)
MONOCYTES # BLD AUTO: 0.58 10*3/MM3 (ref 0.1–0.9)
MONOCYTES NFR BLD AUTO: 8.8 % (ref 5–12)
NEUTROPHILS # BLD AUTO: 4.01 10*3/MM3 (ref 1.4–7)
NEUTROPHILS NFR BLD AUTO: 61.1 % (ref 42.7–76)
NITRITE UR QL STRIP: NEGATIVE
NRBC BLD AUTO-RTO: 0 /100 WBC (ref 0–0)
PH UR STRIP.AUTO: 6 [PH] (ref 5–8)
PLATELET # BLD AUTO: 364 10*3/MM3 (ref 140–450)
PMV BLD AUTO: 12.2 FL (ref 6–12)
POTASSIUM BLD-SCNC: 3.9 MMOL/L (ref 3.5–5.2)
PROT SERPL-MCNC: 7.1 G/DL (ref 6–8.5)
PROT UR QL STRIP: NEGATIVE
RBC # BLD AUTO: 5.65 10*6/MM3 (ref 3.77–5.28)
SODIUM BLD-SCNC: 142 MMOL/L (ref 136–145)
SP GR UR STRIP: 1.02 (ref 1–1.03)
UROBILINOGEN UR QL STRIP: NORMAL
WBC NRBC COR # BLD: 6.56 10*3/MM3 (ref 3.4–10.8)

## 2019-04-18 PROCEDURE — 81003 URINALYSIS AUTO W/O SCOPE: CPT | Performed by: EMERGENCY MEDICINE

## 2019-04-18 PROCEDURE — 82550 ASSAY OF CK (CPK): CPT | Performed by: EMERGENCY MEDICINE

## 2019-04-18 PROCEDURE — 85025 COMPLETE CBC W/AUTO DIFF WBC: CPT | Performed by: EMERGENCY MEDICINE

## 2019-04-18 PROCEDURE — 99284 EMERGENCY DEPT VISIT MOD MDM: CPT

## 2019-04-18 PROCEDURE — 80053 COMPREHEN METABOLIC PANEL: CPT | Performed by: EMERGENCY MEDICINE

## 2019-04-18 RX ORDER — SODIUM CHLORIDE 0.9 % (FLUSH) 0.9 %
10 SYRINGE (ML) INJECTION AS NEEDED
Status: DISCONTINUED | OUTPATIENT
Start: 2019-04-18 | End: 2019-04-18 | Stop reason: HOSPADM

## 2021-03-22 ENCOUNTER — BULK ORDERING (OUTPATIENT)
Dept: CASE MANAGEMENT | Facility: OTHER | Age: 61
End: 2021-03-22

## 2021-03-22 DIAGNOSIS — Z23 IMMUNIZATION DUE: ICD-10-CM
